# Patient Record
Sex: MALE | Race: WHITE | Employment: OTHER | URBAN - METROPOLITAN AREA
[De-identification: names, ages, dates, MRNs, and addresses within clinical notes are randomized per-mention and may not be internally consistent; named-entity substitution may affect disease eponyms.]

---

## 2017-01-03 ENCOUNTER — TELEPHONE (OUTPATIENT)
Dept: CARDIOLOGY CLINIC | Age: 62
End: 2017-01-03

## 2017-01-03 ENCOUNTER — OFFICE VISIT (OUTPATIENT)
Dept: CARDIOLOGY CLINIC | Age: 62
End: 2017-01-03

## 2017-01-03 ENCOUNTER — PROCEDURE VISIT (OUTPATIENT)
Dept: CARDIOLOGY CLINIC | Age: 62
End: 2017-01-03

## 2017-01-03 VITALS
BODY MASS INDEX: 28.3 KG/M2 | SYSTOLIC BLOOD PRESSURE: 134 MMHG | HEIGHT: 71 IN | OXYGEN SATURATION: 97 % | WEIGHT: 202.12 LBS | DIASTOLIC BLOOD PRESSURE: 84 MMHG | HEART RATE: 60 BPM

## 2017-01-03 DIAGNOSIS — Z95.810 AICD PRESENT, DOUBLE CHAMBER: ICD-10-CM

## 2017-01-03 DIAGNOSIS — I25.5 ISCHEMIC CARDIOMYOPATHY: ICD-10-CM

## 2017-01-03 DIAGNOSIS — I48.92 ATRIAL FLUTTER, UNSPECIFIED TYPE (HCC): ICD-10-CM

## 2017-01-03 DIAGNOSIS — I10 ESSENTIAL HYPERTENSION, BENIGN: ICD-10-CM

## 2017-01-03 DIAGNOSIS — I25.810 CORONARY ARTERY DISEASE INVOLVING CORONARY BYPASS GRAFT OF NATIVE HEART WITHOUT ANGINA PECTORIS: ICD-10-CM

## 2017-01-03 DIAGNOSIS — Z95.810 AICD PRESENT, DOUBLE CHAMBER: Primary | ICD-10-CM

## 2017-01-03 DIAGNOSIS — Z98.890 HX OF MITRAL VALVE REPAIR: ICD-10-CM

## 2017-01-03 DIAGNOSIS — I47.20 VENTRICULAR TACHYCARDIA: Primary | ICD-10-CM

## 2017-01-03 PROCEDURE — 99214 OFFICE O/P EST MOD 30 MIN: CPT | Performed by: INTERNAL MEDICINE

## 2017-01-03 PROCEDURE — 93283 PRGRMG EVAL IMPLANTABLE DFB: CPT | Performed by: INTERNAL MEDICINE

## 2017-02-08 ENCOUNTER — TELEPHONE (OUTPATIENT)
Dept: CARDIOLOGY CLINIC | Age: 62
End: 2017-02-08

## 2017-04-05 ENCOUNTER — TELEPHONE (OUTPATIENT)
Dept: CARDIOLOGY CLINIC | Age: 62
End: 2017-04-05

## 2017-04-05 ENCOUNTER — NURSE ONLY (OUTPATIENT)
Dept: CARDIOLOGY CLINIC | Age: 62
End: 2017-04-05

## 2017-04-05 DIAGNOSIS — I25.5 ISCHEMIC CARDIOMYOPATHY: ICD-10-CM

## 2017-04-05 DIAGNOSIS — Z95.810 AICD PRESENT, DOUBLE CHAMBER: ICD-10-CM

## 2017-04-05 DIAGNOSIS — Z95.810 CARDIAC DEFIBRILLATOR IN PLACE: ICD-10-CM

## 2017-04-05 PROCEDURE — 93295 DEV INTERROG REMOTE 1/2/MLT: CPT | Performed by: INTERNAL MEDICINE

## 2017-04-05 PROCEDURE — 93296 REM INTERROG EVL PM/IDS: CPT | Performed by: INTERNAL MEDICINE

## 2017-05-22 ENCOUNTER — OFFICE VISIT (OUTPATIENT)
Dept: CARDIOLOGY CLINIC | Age: 62
End: 2017-05-22

## 2017-05-22 VITALS
HEIGHT: 71 IN | WEIGHT: 207 LBS | SYSTOLIC BLOOD PRESSURE: 158 MMHG | BODY MASS INDEX: 28.98 KG/M2 | HEART RATE: 60 BPM | DIASTOLIC BLOOD PRESSURE: 90 MMHG

## 2017-05-22 DIAGNOSIS — I10 ESSENTIAL HYPERTENSION, BENIGN: ICD-10-CM

## 2017-05-22 DIAGNOSIS — I25.10 ATHEROSCLEROSIS OF NATIVE CORONARY ARTERY OF NATIVE HEART WITHOUT ANGINA PECTORIS: Primary | ICD-10-CM

## 2017-05-22 DIAGNOSIS — I05.9 MITRAL VALVE DISORDER: ICD-10-CM

## 2017-05-22 PROCEDURE — 99213 OFFICE O/P EST LOW 20 MIN: CPT | Performed by: INTERNAL MEDICINE

## 2017-05-22 RX ORDER — LISINOPRIL AND HYDROCHLOROTHIAZIDE 12.5; 1 MG/1; MG/1
1 TABLET ORAL DAILY
Qty: 90 TABLET | Refills: 3 | Status: SHIPPED | OUTPATIENT
Start: 2017-05-22 | End: 2018-04-18 | Stop reason: SDUPTHER

## 2017-06-20 ENCOUNTER — TELEPHONE (OUTPATIENT)
Dept: CARDIOLOGY CLINIC | Age: 62
End: 2017-06-20

## 2017-06-20 DIAGNOSIS — I10 ESSENTIAL HYPERTENSION, BENIGN: Chronic | ICD-10-CM

## 2017-06-20 DIAGNOSIS — I82.4Z9 DEEP VEIN THROMBOSIS (DVT) OF DISTAL VEIN OF LOWER EXTREMITY, UNSPECIFIED CHRONICITY, UNSPECIFIED LATERALITY (HCC): ICD-10-CM

## 2017-06-20 DIAGNOSIS — I25.5 ISCHEMIC CARDIOMYOPATHY: ICD-10-CM

## 2017-06-20 DIAGNOSIS — I63.9 IMPENDING CEREBROVASCULAR ACCIDENT (HCC): Primary | ICD-10-CM

## 2017-06-20 DIAGNOSIS — I25.810 CORONARY ARTERY DISEASE INVOLVING CORONARY BYPASS GRAFT OF NATIVE HEART WITHOUT ANGINA PECTORIS: ICD-10-CM

## 2017-06-23 ENCOUNTER — TELEPHONE (OUTPATIENT)
Dept: CARDIOLOGY CLINIC | Age: 62
End: 2017-06-23

## 2017-07-05 ENCOUNTER — TELEPHONE (OUTPATIENT)
Dept: CARDIOLOGY CLINIC | Age: 62
End: 2017-07-05

## 2017-07-05 DIAGNOSIS — I10 ESSENTIAL HYPERTENSION, BENIGN: Chronic | ICD-10-CM

## 2017-07-05 DIAGNOSIS — E78.5 HYPERLIPIDEMIA, UNSPECIFIED HYPERLIPIDEMIA TYPE: Primary | Chronic | ICD-10-CM

## 2017-07-05 RX ORDER — ATORVASTATIN CALCIUM 20 MG/1
20 TABLET, FILM COATED ORAL DAILY
Qty: 90 TABLET | Refills: 3 | Status: SHIPPED | OUTPATIENT
Start: 2017-07-05 | End: 2018-08-08 | Stop reason: SDUPTHER

## 2017-07-14 RX ORDER — METOPROLOL SUCCINATE 50 MG/1
TABLET, EXTENDED RELEASE ORAL
Qty: 30 TABLET | Refills: 10 | Status: SHIPPED | OUTPATIENT
Start: 2017-07-14 | End: 2018-07-05 | Stop reason: SDUPTHER

## 2017-07-19 ENCOUNTER — NURSE ONLY (OUTPATIENT)
Dept: CARDIOLOGY CLINIC | Age: 62
End: 2017-07-19

## 2017-07-19 DIAGNOSIS — I25.5 ISCHEMIC CARDIOMYOPATHY: ICD-10-CM

## 2017-07-19 DIAGNOSIS — Z95.810 AICD PRESENT, DOUBLE CHAMBER: ICD-10-CM

## 2017-07-19 DIAGNOSIS — Z95.810 CARDIAC DEFIBRILLATOR IN PLACE: ICD-10-CM

## 2017-07-19 PROCEDURE — 93296 REM INTERROG EVL PM/IDS: CPT | Performed by: INTERNAL MEDICINE

## 2017-07-19 PROCEDURE — 93295 DEV INTERROG REMOTE 1/2/MLT: CPT | Performed by: INTERNAL MEDICINE

## 2017-10-25 ENCOUNTER — NURSE ONLY (OUTPATIENT)
Dept: CARDIOLOGY CLINIC | Age: 62
End: 2017-10-25

## 2017-10-25 DIAGNOSIS — Z95.810 CARDIAC DEFIBRILLATOR IN PLACE: ICD-10-CM

## 2017-10-25 DIAGNOSIS — Z95.810 AICD PRESENT, DOUBLE CHAMBER: ICD-10-CM

## 2017-10-25 DIAGNOSIS — I25.5 ISCHEMIC CARDIOMYOPATHY: ICD-10-CM

## 2017-10-25 PROCEDURE — 93296 REM INTERROG EVL PM/IDS: CPT | Performed by: INTERNAL MEDICINE

## 2017-10-25 PROCEDURE — 93295 DEV INTERROG REMOTE 1/2/MLT: CPT | Performed by: INTERNAL MEDICINE

## 2017-10-31 ENCOUNTER — PROCEDURE VISIT (OUTPATIENT)
Dept: CARDIOLOGY CLINIC | Age: 62
End: 2017-10-31

## 2017-10-31 ENCOUNTER — HOSPITAL ENCOUNTER (OUTPATIENT)
Dept: OTHER | Age: 62
Discharge: HOME | End: 2017-10-31
Attending: INTERNAL MEDICINE | Admitting: INTERNAL MEDICINE

## 2017-10-31 ENCOUNTER — OFFICE VISIT (OUTPATIENT)
Dept: CARDIOLOGY CLINIC | Age: 62
End: 2017-10-31

## 2017-10-31 VITALS
OXYGEN SATURATION: 97 % | HEIGHT: 71 IN | HEART RATE: 72 BPM | DIASTOLIC BLOOD PRESSURE: 80 MMHG | WEIGHT: 210 LBS | SYSTOLIC BLOOD PRESSURE: 122 MMHG | BODY MASS INDEX: 29.4 KG/M2

## 2017-10-31 DIAGNOSIS — Z95.810 AICD PRESENT, DOUBLE CHAMBER: ICD-10-CM

## 2017-10-31 DIAGNOSIS — I48.92 ATRIAL FLUTTER, UNSPECIFIED TYPE (HCC): ICD-10-CM

## 2017-10-31 DIAGNOSIS — I48.92 ATRIAL FLUTTER, UNSPECIFIED TYPE (HCC): Primary | ICD-10-CM

## 2017-10-31 DIAGNOSIS — I47.20 VENTRICULAR TACHYCARDIA: ICD-10-CM

## 2017-10-31 DIAGNOSIS — Z79.899 LONG TERM CURRENT USE OF AMIODARONE: ICD-10-CM

## 2017-10-31 DIAGNOSIS — I25.5 ISCHEMIC CARDIOMYOPATHY: ICD-10-CM

## 2017-10-31 DIAGNOSIS — I10 ESSENTIAL HYPERTENSION: ICD-10-CM

## 2017-10-31 DIAGNOSIS — I25.10 ATHEROSCLEROSIS OF NATIVE CORONARY ARTERY OF NATIVE HEART WITHOUT ANGINA PECTORIS: ICD-10-CM

## 2017-10-31 LAB
ALT SERPL-CCNC: 17 U/L (ref 10–40)
ANION GAP SERPL CALCULATED.3IONS-SCNC: 16 MMOL/L (ref 3–16)
AST SERPL-CCNC: 21 U/L (ref 15–37)
BUN BLDV-MCNC: 20 MG/DL (ref 7–20)
CALCIUM SERPL-MCNC: 9.7 MG/DL (ref 8.3–10.6)
CHLORIDE BLD-SCNC: 99 MMOL/L (ref 99–110)
CO2: 26 MMOL/L (ref 21–32)
CREAT SERPL-MCNC: 1.2 MG/DL (ref 0.8–1.3)
GFR AFRICAN AMERICAN: >60
GFR NON-AFRICAN AMERICAN: >60
GLUCOSE BLD-MCNC: 95 MG/DL (ref 70–99)
POTASSIUM SERPL-SCNC: 4.5 MMOL/L (ref 3.5–5.1)
SODIUM BLD-SCNC: 141 MMOL/L (ref 136–145)
TSH REFLEX: 1.89 UIU/ML (ref 0.27–4.2)

## 2017-10-31 PROCEDURE — 99214 OFFICE O/P EST MOD 30 MIN: CPT | Performed by: INTERNAL MEDICINE

## 2017-10-31 PROCEDURE — 93283 PRGRMG EVAL IMPLANTABLE DFB: CPT | Performed by: INTERNAL MEDICINE

## 2017-10-31 RX ORDER — AMIODARONE HYDROCHLORIDE 200 MG/1
200 TABLET ORAL 2 TIMES DAILY
Qty: 180 TABLET | Refills: 3 | Status: SHIPPED | OUTPATIENT
Start: 2017-10-31 | End: 2018-11-05 | Stop reason: SDUPTHER

## 2017-10-31 NOTE — COMMUNICATION BODY
[address]   Electrophysiology   Date: 10/31/2017  I had the privilege of visiting Sarah Figueredo in the office. HPI: Sarah Figueredo is a 57 y.o. who is here for a follow up visit. 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 He has had atrial flutter on his interrogation of device. Xarelto prescribed but patient has not started it yet due to the cost and risk of bleeding. He is well aware of risk of stroke. On his device interrogation, it has been noted that he has had multiple episodes of ventricular tachycardia requiring ATP. Interval History: Mr Emigdio Caputo was brought in today after device showed 14 episodes of VT requiring ATP. He denies having any symptoms. He has no chest pain, change in exertional shortness of breath palpitations or dizziness. He has not had any falls. He used to be taking amiodarone for ventricular tachycardia. Patient has seen Dr. arndt recently and hydrochlorothiazide has been for his blood pressure. He also has discussed anticoagulation with Dr. arndt. Device interrogation today revealed 30 episodes of ventricular tachycardia. Many of these episodes terminated successfully with ATP. Past Medical History:   Diagnosis Date    CAD (coronary artery disease)     [facility] [facility]     Hyperlipidemia     Hypertension     MI (myocardial infarction)     VT (ventricular tachycardia) ([facility] [address]         Past Surgical History:   Procedure Laterality Date    CARDIAC DEFIBRILLATOR PLACEMENT  8/27/15    dual chamber ICD, Medtronic    CARDIAC SURGERY      cabg x 3, cabg ?  2 v second time    DIAGNOSTIC CARDIAC CATH LAB PROCEDURE         Allergies:  No Known Allergies    Medication:  Current Outpatient Prescriptions   Medication Sig Dispense Refill    metoprolol succinate (TOPROL XL) 50 MG extended release tablet TAKE ONE TABLET BY MOUTH DAILY 30 tablet 10    atorvastatin (LIPITOR) 20 MG tablet Take 1 tablet by mouth daily 90 tablet 3    Scooter MISC by Does not apply route 1 each 0    lisinopril-hydrochlorothiazide (PRINZIDE;ZESTORETIC) 10-12.5 MG per tablet Take 1 tablet by mouth daily 90 tablet 3    aspirin 81 MG EC tablet Take 162 mg by mouth daily        No current facility-administered medications for this visit. Social History:  Reviewed. reports that he quit smoking about 2 years ago. He quit after 40.00 years of use. He has never used smokeless tobacco. He reports that he drinks alcohol. He reports that he uses drugs, including Marijuana. Family History:  Reviewed. family history includes Heart Disease in his father and mother. Review of System:    · General ROS: negative for - chills, fever   · Psychological ROS: negative for - anxiety or depression  · Ophthalmic ROS: negative for - eye pain or loss of vision  · ENT ROS: negative for - headaches, sore throat   · Allergy and Immunology ROS: negative for - hives  · Hematological and Lymphatic ROS: negative for - bleeding problems, blood clots, bruising or jaundice  · Endocrine ROS: negative for - skin changes, temperature intolerance or unexpected weight changes  · Respiratory ROS: negative for - cough, sputum, wheezing  · Cardiovascular ROS: Per HPI. · Gastrointestinal ROS: negative for - abdominal pain, diarrhea, nausea/vomiting, bleeding   · Genito-Urinary ROS: negative for - dysuria or incontinence  · Musculoskeletal ROS: negative for - joint swelling   · Neurological ROS: negative for - confusion, numbness/tingling, seizures, weakness  · Dermatological ROS: negative for - rash    Physical Examination:  Vitals:    10/31/17 1214   BP: 122/80   Pulse: 72   SpO2: 97%       · Constitutional: Oriented. No distress. · Head: Normocephalic and atraumatic.    · Mouth/Throat: Oropharynx is clear and moist. · Eyes: Conjunctivae normal. EOM are normal.   · Neck: Normal range of motion. Neck supple. No rigidity. No JVD present. · Cardiovascular: Normal rate, regular rhythm, E7&Y0 II/VI systolic murmur   · Pulmonary/Chest: Bilateral respiratory sounds. No wheezes. No rhonchi. · Abdominal: Soft. Bowel sounds present. No distension, No tenderness. · Musculoskeletal: No tenderness. No edema    · Lymphadenopathy: Has no cervical adenopathy. · Neurological: Alert and oriented. LUE weakness  · Skin: Skin is warm and dry. No rash noted. · Psychiatric: Has a normal behavior       Labs:  Reviewed. ECG:   Echo: 8/26/15  Summary  -Normal left ventricular wall thickness.  -Left ventricular size is mildly increased .  -Left ventricular function is mildly decreased with ejection fraction  estimated at 45%. -There is hypokinesis of the inferior wall noted. -An annuloplasty ring is seen in the mitral position.  -Mild to moderate mitral regurgitation is present.  -The left atrium is dilated. -There is trivial tricuspid regurgitation with RVSP estimated at 24 mmHg. History:   1993 CABG  3/2008: Re-do CABG SVG to PDA (pre op cath showed patent LIMA to LAD, SVG to RCA/OM occluded. Collaterals from LCA to RCA and Septals to CX.   3/2008: MV repair using 26mm Physio annuloplasty ring.      Assessment:   Patient Active Problem List    Diagnosis Date Noted    Atrial flutter ([facility] [address] 01/03/2017    Hx of mitral valve repair 01/03/2017    Coronary artery disease involving coronary bypass graft of native heart without angina pectoris     AICD present, double chamber 08/27/2015    Ischemic cardiomyopathy     Ventricular tachycardia ([address] 08/25/2015    HLD (hyperlipidemia) 09/21/2011    Coronary Artery Disease 09/21/2011    Essential hypertension, benign 09/21/2011    Mitral valve disorder 09/21/2011    Old MI (myocardial infarction) 09/21/2011    Endocarditis 09/21/2011    CVA (cerebral vascular accident) ([address]

## 2017-10-31 NOTE — PROGRESS NOTES
[address]   Electrophysiology   Date: 10/31/2017  I had the privilege of visiting Leo Edwards in the office. HPI: Leo Edwards is a 57 y.o. who is here for a follow up visit. 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 He has had atrial flutter on his interrogation of device. Xarelto prescribed but patient has not started it yet due to the cost and risk of bleeding. He is well aware of risk of stroke. On his device interrogation, it has been noted that he has had multiple episodes of ventricular tachycardia requiring ATP. Interval History: Mr Neptali Gallardo was brought in today after device showed 14 episodes of VT requiring ATP. He denies having any symptoms. He has no chest pain, change in exertional shortness of breath palpitations or dizziness. He has not had any falls. He used to be taking amiodarone for ventricular tachycardia. Patient has seen Dr. arndt recently and hydrochlorothiazide has been for his blood pressure. He also has discussed anticoagulation with Dr. arndt. Device interrogation today revealed 30 episodes of ventricular tachycardia. Many of these episodes terminated successfully with ATP. Past Medical History:   Diagnosis Date    CAD (coronary artery disease)     [facility] [address]     Hyperlipidemia     Hypertension     MI (myocardial infarction)     VT (ventricular tachycardia) ([facility] [address]         Past Surgical History:   Procedure Laterality Date    CARDIAC DEFIBRILLATOR PLACEMENT  8/27/15    dual chamber ICD, Medtronic    CARDIAC SURGERY      cabg x 3, cabg ?  2 v second time    DIAGNOSTIC CARDIAC CATH LAB PROCEDURE         Allergies:  No Known Allergies    Medication:  Current Outpatient Prescriptions   Medication Sig Dispense Refill    metoprolol succinate (TOPROL XL) 50 MG extended release tablet TAKE ONE TABLET BY MOUTH DAILY 30 tablet 10    atorvastatin (LIPITOR) 20 MG tablet Take 1 tablet by mouth daily 90 tablet 3    Scooter MISC by Does not apply route 1 each 0    lisinopril-hydrochlorothiazide (PRINZIDE;ZESTORETIC) 10-12.5 MG per tablet Take 1 tablet by mouth daily 90 tablet 3    aspirin 81 MG EC tablet Take 162 mg by mouth daily        No current facility-administered medications for this visit. Social History:  Reviewed. reports that he quit smoking about 2 years ago. He quit after 40.00 years of use. He has never used smokeless tobacco. He reports that he drinks alcohol. He reports that he uses drugs, including Marijuana. Family History:  Reviewed. family history includes Heart Disease in his father and mother. Review of System:    · General ROS: negative for - chills, fever   · Psychological ROS: negative for - anxiety or depression  · Ophthalmic ROS: negative for - eye pain or loss of vision  · ENT ROS: negative for - headaches, sore throat   · Allergy and Immunology ROS: negative for - hives  · Hematological and Lymphatic ROS: negative for - bleeding problems, blood clots, bruising or jaundice  · Endocrine ROS: negative for - skin changes, temperature intolerance or unexpected weight changes  · Respiratory ROS: negative for - cough, sputum, wheezing  · Cardiovascular ROS: Per HPI. · Gastrointestinal ROS: negative for - abdominal pain, diarrhea, nausea/vomiting, bleeding   · Genito-Urinary ROS: negative for - dysuria or incontinence  · Musculoskeletal ROS: negative for - joint swelling   · Neurological ROS: negative for - confusion, numbness/tingling, seizures, weakness  · Dermatological ROS: negative for - rash    Physical Examination:  Vitals:    10/31/17 1214   BP: 122/80   Pulse: 72   SpO2: 97%       · Constitutional: Oriented. No distress. · Head: Normocephalic and atraumatic.    · Mouth/Throat: Oropharynx is clear and moist. · Eyes: Conjunctivae normal. EOM are normal.   · Neck: Normal range of motion. Neck supple. No rigidity. No JVD present. · Cardiovascular: Normal rate, regular rhythm, A0&Z6 II/VI systolic murmur   · Pulmonary/Chest: Bilateral respiratory sounds. No wheezes. No rhonchi. · Abdominal: Soft. Bowel sounds present. No distension, No tenderness. · Musculoskeletal: No tenderness. No edema    · Lymphadenopathy: Has no cervical adenopathy. · Neurological: Alert and oriented. LUE weakness  · Skin: Skin is warm and dry. No rash noted. · Psychiatric: Has a normal behavior       Labs:  Reviewed. ECG:   Echo: 8/26/15  Summary  -Normal left ventricular wall thickness.  -Left ventricular size is mildly increased .  -Left ventricular function is mildly decreased with ejection fraction  estimated at 45%. -There is hypokinesis of the inferior wall noted. -An annuloplasty ring is seen in the mitral position.  -Mild to moderate mitral regurgitation is present.  -The left atrium is dilated. -There is trivial tricuspid regurgitation with RVSP estimated at 24 mmHg. History:   1993 CABG  3/2008: Re-do CABG SVG to PDA (pre op cath showed patent LIMA to LAD, SVG to RCA/OM occluded. Collaterals from LCA to RCA and Septals to CX.   3/2008: MV repair using 26mm Physio annuloplasty ring.      Assessment:   Patient Active Problem List    Diagnosis Date Noted    Atrial flutter ([facility] [address] 01/03/2017    Hx of mitral valve repair 01/03/2017    Coronary artery disease involving coronary bypass graft of native heart without angina pectoris     AICD present, double chamber 08/27/2015    Ischemic cardiomyopathy     Ventricular tachycardia ([address] 08/25/2015    HLD (hyperlipidemia) 09/21/2011    Coronary Artery Disease 09/21/2011    Essential hypertension, benign 09/21/2011    Mitral valve disorder 09/21/2011    Old MI (myocardial infarction) 09/21/2011    Endocarditis 09/21/2011    CVA (cerebral vascular accident) ([address] diet to assure blood pressure remains controlled for cardiovascular risk factor modification.   - The patient is counseled to avoid excess caffeine, and energy drinks as this may exacerbated ectopy and arrhythmia. - The patient is counseled to get regular exercise 3-5 times per week to control cardiovascular risk factors. Thank you for allowing me to participate in the care of Daily Velez. Further evaluation will be based upon the patient's clinical course and testing results. All questions and concerns were addressed to the patient/family. Alternatives to my treatment were discussed.         Yoli Pierre MD, MPH  [place] [address]   Office: [phone]

## 2017-10-31 NOTE — PROGRESS NOTES
Patient comes in for programming evaluation for his defibrillator. All sensing and pacing parameters are within normal range. He has had increased VT recordings requiring ATP. No changes need to be made at this time. Please see interrogation for more detail. Patient will see Dr. Ruthy Momin today.

## 2017-10-31 NOTE — LETTER
Diagnosis Date Noted    Atrial flutter ([facility] 01/03/2017    Hx of mitral valve repair 01/03/2017    Coronary artery disease involving coronary bypass graft of native heart without angina pectoris     AICD present, double chamber 08/27/2015    Ischemic cardiomyopathy     Ventricular tachycardia ([facility] 08/25/2015    HLD (hyperlipidemia) 09/21/2011    Coronary Artery Disease 09/21/2011    Essential hypertension, benign 09/21/2011    Mitral valve disorder 09/21/2011    Old MI (myocardial infarction) 09/21/2011    Endocarditis 09/21/2011    [facility] 09/21/2011    DVT (deep vein thrombosis), lower extremity, distal (calf) (peroneal)(tibial) (lower leg NOS) 09/21/2011        Plan:    - Ventricular tachycardia, CAD:    - Multiple episodes of ventricular tachycardia with cycle length of 300 msec, treated successfully with ATP. - Treatment options including antiarrhythmic therapy and/or ablation were discussed with patient. Risks, benefits and alternative of each treatment options were explained. All questions answered. - Discussed treatment with Amiodarone. Risks, benefits and alternative were explained. Dicussed side effects of amiodarone therapy. Patient would like to proceed with therapy. - Start Amiodarone 200 mg bid. - If continues having VT, will consider ablation.      - Continue BB and ACE-i.      - Echo    - Myoview for evaluation of ischemia. - Paroxysmal Atrial Flutter   - Diagnosed with device interrogation on 10/31/16 . He was asymptomatic.    - Patient has high [id number] score and requires anticoagulation to prevent thromboembolic events. History of stroke. - I have discussed anticoagulation to decrease the risk of thromboembolic. Has not started Xarelto. - Prescription has been given. - He also has discussed this with Dr. Oswaldo Seymour. - He is well aware of risk of stroke. - CAD s/p CABG in the 1997 and 2008. - No angina.

## 2017-11-09 ENCOUNTER — HOSPITAL ENCOUNTER (OUTPATIENT)
Dept: NON INVASIVE DIAGNOSTICS | Age: 62
Discharge: HOME | End: 2017-11-09
Attending: INTERNAL MEDICINE | Admitting: INTERNAL MEDICINE

## 2017-11-09 DIAGNOSIS — I48.92 ATRIAL FLUTTER (HCC): ICD-10-CM

## 2017-11-09 LAB
LV EF: 41 %
LV EF: 45 %
LVEF MODALITY: NORMAL
LVEF MODALITY: NORMAL

## 2018-01-30 ENCOUNTER — OFFICE VISIT (OUTPATIENT)
Dept: CARDIOLOGY CLINIC | Age: 63
End: 2018-01-30

## 2018-01-30 ENCOUNTER — PROCEDURE VISIT (OUTPATIENT)
Dept: CARDIOLOGY CLINIC | Age: 63
End: 2018-01-30

## 2018-01-30 ENCOUNTER — HOSPITAL ENCOUNTER (OUTPATIENT)
Dept: OTHER | Age: 63
Discharge: HOME | End: 2018-01-30
Attending: INTERNAL MEDICINE | Admitting: INTERNAL MEDICINE

## 2018-01-30 VITALS
SYSTOLIC BLOOD PRESSURE: 112 MMHG | DIASTOLIC BLOOD PRESSURE: 68 MMHG | OXYGEN SATURATION: 98 % | HEART RATE: 60 BPM | HEIGHT: 71 IN | WEIGHT: 213 LBS | BODY MASS INDEX: 29.82 KG/M2

## 2018-01-30 DIAGNOSIS — I25.5 ISCHEMIC CARDIOMYOPATHY: ICD-10-CM

## 2018-01-30 DIAGNOSIS — Z95.810 AICD PRESENT, DOUBLE CHAMBER: ICD-10-CM

## 2018-01-30 DIAGNOSIS — I47.20 VENTRICULAR TACHYCARDIA: Primary | ICD-10-CM

## 2018-01-30 DIAGNOSIS — I25.10 ATHEROSCLEROSIS OF NATIVE CORONARY ARTERY OF NATIVE HEART WITHOUT ANGINA PECTORIS: ICD-10-CM

## 2018-01-30 DIAGNOSIS — I48.0 PAROXYSMAL ATRIAL FIBRILLATION (HCC): ICD-10-CM

## 2018-01-30 DIAGNOSIS — I10 ESSENTIAL HYPERTENSION, BENIGN: Chronic | ICD-10-CM

## 2018-01-30 DIAGNOSIS — I63.9 CEREBROVASCULAR ACCIDENT (CVA), UNSPECIFIED MECHANISM (HCC): ICD-10-CM

## 2018-01-30 LAB
ALBUMIN SERPL-MCNC: 4.4 G/DL (ref 3.4–5)
ALP BLD-CCNC: 117 U/L (ref 40–129)
ALT SERPL-CCNC: 13 U/L (ref 10–40)
AST SERPL-CCNC: 19 U/L (ref 15–37)
BILIRUB SERPL-MCNC: 0.4 MG/DL (ref 0–1)
BILIRUBIN DIRECT: <0.2 MG/DL (ref 0–0.3)
BILIRUBIN, INDIRECT: NORMAL MG/DL (ref 0–1)
TOTAL PROTEIN: 7.6 G/DL (ref 6.4–8.2)
TSH REFLEX: 4 UIU/ML (ref 0.27–4.2)

## 2018-01-30 PROCEDURE — 93283 PRGRMG EVAL IMPLANTABLE DFB: CPT | Performed by: INTERNAL MEDICINE

## 2018-01-30 PROCEDURE — 99215 OFFICE O/P EST HI 40 MIN: CPT | Performed by: INTERNAL MEDICINE

## 2018-01-30 NOTE — PROGRESS NOTES
Patient comes in for programming evaluation for his defibrillator. All sensing and pacing parameters are within normal range. He has had some VT recordings that were long that were below his detection rate. Changed VT detection to 150. Please see interrogation for more detail. Patient will follow up in 3 months in office or remotely.

## 2018-01-30 NOTE — PROGRESS NOTES
TAKE ONE TABLET BY MOUTH DAILY 30 tablet 10    atorvastatin (LIPITOR) 20 MG tablet Take 1 tablet by mouth daily 90 tablet 3    Scooter MISC by Does not apply route 1 each 0    lisinopril-hydrochlorothiazide (PRINZIDE;ZESTORETIC) 10-12.5 MG per tablet Take 1 tablet by mouth daily 90 tablet 3    aspirin 81 MG EC tablet Take 162 mg by mouth daily        No current facility-administered medications for this visit. Social History:  Reviewed. reports that he quit smoking about 2 years ago. He quit after 40.00 years of use. He has never used smokeless tobacco. He reports that he drinks alcohol. He reports that he uses drugs, including Marijuana. Family History:  Reviewed. family history includes Heart Disease in his father and mother. Review of System:    · General ROS: negative for - chills, fever   · Psychological ROS: negative for - anxiety or depression  · Ophthalmic ROS: negative for - eye pain or loss of vision  · ENT ROS: negative for - headaches, sore throat   · Allergy and Immunology ROS: negative for - hives  · Hematological and Lymphatic ROS: negative for - bleeding problems, blood clots, bruising or jaundice  · Endocrine ROS: negative for - skin changes, temperature intolerance or unexpected weight changes  · Respiratory ROS: negative for - cough, sputum, wheezing  · Cardiovascular ROS: Per HPI. · Gastrointestinal ROS: negative for - abdominal pain, diarrhea, nausea/vomiting, bleeding   · Genito-Urinary ROS: negative for - dysuria or incontinence  · Musculoskeletal ROS: negative for - joint swelling   · Neurological ROS: negative for - confusion, numbness/tingling, seizures, ++ Left sided weakness  · Dermatological ROS: negative for - rash    Physical Examination:  Vitals:    01/30/18 1053   BP: 112/68   Pulse: 60   SpO2: 98%     · Constitutional: Oriented. No distress. · Head: Normocephalic and atraumatic.    · Mouth/Throat: Oropharynx is clear and moist.   · Eyes: Conjunctivae normal. moderately reduced with inferior lateral akinesis and    ejection fraction of 41%. Recommendation    Discussed with pt and wife. They will f/u soon with Dr. Denae Harris for questions surrounding antiarrhythmic    therapy     Imaging Results          Stress ejection      Ejection fraction:41 %        History:   1993 CABG  3/2008: Re-do CABG SVG to PDA (pre op cath showed patent LIMA to LAD, SVG to RCA/OM occluded. Collaterals from LCA to RCA and Septals to CX.   3/2008: MV repair using 26mm Physio annuloplasty ring. Assessment:   Patient Active Problem List    Diagnosis Date Noted    Atrial flutter ([address] 01/03/2017    Hx of mitral valve repair 01/03/2017    Coronary artery disease involving coronary bypass graft of native heart without angina pectoris     AICD present, double chamber 08/27/2015    Ischemic cardiomyopathy     Ventricular tachycardia ([address] 08/25/2015    HLD (hyperlipidemia) 09/21/2011    Coronary Artery Disease 09/21/2011    Essential hypertension, benign 09/21/2011    Mitral valve disorder 09/21/2011    Old MI (myocardial infarction) 09/21/2011    Endocarditis 09/21/2011    CVA (cerebral vascular accident) ([address] 09/21/2011    DVT (deep vein thrombosis), lower extremity, distal (calf) (peroneal)(tibial) (lower leg NOS) 09/21/2011        Plan:    - Ventricular tachycardia, CAD:    - Multiple episodes of ventricular tachycardia treated successfully with ATP. - Treated with Amiodarone. - Continues having VT but now slower. - VT cycle length ~ 400 msec. - Long discussion with patient and his wife regarding treatment options.      - Recommend ablation of VT.      - The risks, benefits and alternatives of the ablation procedure were discussed with the patient.  The risks including, but not limited to, the risks of bleeding, infection, radiation exposure, injury to vascular, cardiac and surrounding structures (including pneumothorax), stroke, cardiac perforation, tamponade, need

## 2018-02-14 ENCOUNTER — OFFICE VISIT (OUTPATIENT)
Dept: CARDIOLOGY CLINIC | Age: 63
End: 2018-02-14

## 2018-02-14 VITALS
DIASTOLIC BLOOD PRESSURE: 70 MMHG | HEIGHT: 71 IN | SYSTOLIC BLOOD PRESSURE: 130 MMHG | HEART RATE: 64 BPM | BODY MASS INDEX: 29.68 KG/M2 | WEIGHT: 212 LBS

## 2018-02-14 DIAGNOSIS — I05.9 MITRAL VALVE DISEASE: ICD-10-CM

## 2018-02-14 DIAGNOSIS — I10 ESSENTIAL HYPERTENSION, BENIGN: Chronic | ICD-10-CM

## 2018-02-14 DIAGNOSIS — I25.10 ATHEROSCLEROSIS OF NATIVE CORONARY ARTERY OF NATIVE HEART WITHOUT ANGINA PECTORIS: Primary | ICD-10-CM

## 2018-02-14 DIAGNOSIS — E78.5 HYPERLIPIDEMIA, UNSPECIFIED HYPERLIPIDEMIA TYPE: Chronic | ICD-10-CM

## 2018-02-14 DIAGNOSIS — I47.20 VENTRICULAR TACHYCARDIA: ICD-10-CM

## 2018-02-14 DIAGNOSIS — I63.9 CEREBROVASCULAR ACCIDENT (CVA), UNSPECIFIED MECHANISM (HCC): ICD-10-CM

## 2018-02-14 DIAGNOSIS — I25.5 ISCHEMIC CARDIOMYOPATHY: ICD-10-CM

## 2018-02-14 PROCEDURE — 99214 OFFICE O/P EST MOD 30 MIN: CPT | Performed by: INTERNAL MEDICINE

## 2018-02-14 RX ORDER — ATORVASTATIN CALCIUM 40 MG/1
40 TABLET, FILM COATED ORAL DAILY
Qty: 90 TABLET | Refills: 0 | Status: SHIPPED | OUTPATIENT
Start: 2018-02-14 | End: 2018-07-20 | Stop reason: SDUPTHER

## 2018-02-14 NOTE — PROGRESS NOTES
lisinopril-hydrochlorothiazide (PRINZIDE;ZESTORETIC) 10-12.5 MG per tablet Take 1 tablet by mouth daily 90 tablet 3    aspirin 81 MG EC tablet Take 81 mg by mouth daily        No current facility-administered medications for this visit. Social History:  Reviewed. reports that he quit smoking about 2 years ago. He quit after 40.00 years of use. He has never used smokeless tobacco. He reports that he drinks alcohol. He reports that he uses drugs, including Marijuana. Family History:  Reviewed. family history includes Heart Disease in his father and mother. Review of System:    · General ROS: negative for - chills, fever   · Psychological ROS: negative for - anxiety or depression  · Ophthalmic ROS: negative for - eye pain or loss of vision  · ENT ROS: negative for - headaches, sore throat   · Allergy and Immunology ROS: negative for - hives  · Hematological and Lymphatic ROS: negative for - bleeding problems, blood clots, bruising or jaundice  · Endocrine ROS: negative for - skin changes, temperature intolerance or unexpected weight changes  · Respiratory ROS: negative for - cough, sputum, wheezing  · Cardiovascular ROS: Per HPI. · Gastrointestinal ROS: negative for - abdominal pain, diarrhea, nausea/vomiting, bleeding   · Genito-Urinary ROS: negative for - dysuria or incontinence  · Musculoskeletal ROS: negative for - joint swelling   · Neurological ROS: negative for - confusion, numbness/tingling, seizures, ++ Left sided weakness  · Dermatological ROS: negative for - rash    Physical Examination:  Vitals:    02/14/18 0937   BP: 130/70   Pulse: 64     · Constitutional: Oriented. No distress. · Head: Normocephalic and atraumatic. · Mouth/Throat: Oropharynx is clear and moist.   · Eyes: Conjunctivae normal. EOM are normal.   · Neck: Normal range of motion. Neck supple. No rigidity. No JVD present.    · Cardiovascular: Normal rate, regular rhythm, X9&U5 II/VI systolic murmur   · Pulmonary/Chest: CABG  3/2008: Re-do CABG SVG to PDA (pre op cath showed patent LIMA to LAD, SVG to RCA/OM occluded. Collaterals from LCA to RCA and Septals to CX.   3/2008: MV repair using 26mm Physio annuloplasty ring. Assessment:   Patient Active Problem List    Diagnosis Date Noted    Atrial flutter ([address] 01/03/2017    Hx of mitral valve repair 01/03/2017    Coronary artery disease involving coronary bypass graft of native heart without angina pectoris     AICD present, double chamber 08/27/2015    Ischemic cardiomyopathy     Ventricular tachycardia ([address] 08/25/2015    HLD (hyperlipidemia) 09/21/2011    Coronary Artery Disease 09/21/2011    Essential hypertension, benign 09/21/2011    Mitral valve disorder 09/21/2011    Old MI (myocardial infarction) 09/21/2011    Endocarditis 09/21/2011    CVA (cerebral vascular accident) ([address] 09/21/2011    DVT (deep vein thrombosis), lower extremity, distal (calf) (peroneal)(tibial) (lower leg NOS) 09/21/2011        Plan:    - Ventricular tachycardia, CAD:    - Multiple episodes of ventricular tachycardia treated successfully with ATP. - Treated with Amiodarone. - Continues having VT but now slower. - VT cycle length ~ 400 msec. - Long discussion with patient and his wife regarding treatment options.      - Recommend ablation of VT.      - The risks, benefits and alternatives of the ablation procedure were discussed with the patient. The risks including, but not limited to, the risks of bleeding, infection, radiation exposure, injury to vascular, cardiac and surrounding structures (including pneumothorax), stroke, cardiac perforation, tamponade, need for emergent open heart surgery, need for pacemaker implantation, Injury to the phrenic nerve, injury to the esophagus, myocardial infarction and death were discussed in detail. Success rate discussed ~ 70% and may need repeat ablation in future. - He is not sure and will think about the procedure.      -

## 2018-02-14 NOTE — LETTER
· Dermatological ROS: negative for - rash    Physical Examination:  Vitals:    18 0937   BP: 130/70   Pulse: 64     · Constitutional: Oriented. No distress. · Head: Normocephalic and atraumatic. · Mouth/Throat: Oropharynx is clear and moist.   · Eyes: Conjunctivae normal. EOM are normal.   · Neck: Normal range of motion. Neck supple. No rigidity. No JVD present. · Cardiovascular: Normal rate, regular rhythm, C5&K1 II/VI systolic murmur   · Pulmonary/Chest: Bilateral respiratory sounds. No wheezes. No rhonchi. · Abdominal: Soft. Bowel sounds present. No distension, No tenderness. · Musculoskeletal: No tenderness. No edema    · Lymphadenopathy: Has no cervical adenopathy. · Neurological: Alert and oriented. Left sided weakness from prior stroke   · Skin: Skin is warm and dry. No rash noted. · Psychiatric: Has a normal behavior     Labs:   Cr 1.2 (10/31/17)  ALT 17 (10/31/17)  AST 21 (10/31/17)  TSH 1.89  (10/31/17)  LDL 82 (16)  Reviewed. EC2018   A Paced     Echo 17  Summary   -Normal left ventricle size, wall thickness and systolic function with an   estimated ejection fraction of 45%. -There is severe inferior hypokinesis. -There is dense mitral annular calcification that may represent an   annuloplasty ring. The maximum mitral valve pressure gradient is 13 mmHg and   the mean pressure gradient is 5 mmHg. -Mild mitral regurgitation is present.   -Pacer / ICD wire is visualized in the right ventricle.   -Trivial tricuspid regurgitation. 8/26/15  Summary  -Normal left ventricular wall thickness.  -Left ventricular size is mildly increased .  -Left ventricular function is mildly decreased with ejection fraction  estimated at 45%. -There is hypokinesis of the inferior wall noted. -An annuloplasty ring is seen in the mitral position.  -Mild to moderate mitral regurgitation is present.  -The left atrium is dilated. discussed with the patient. The risks including, but not limited to, the risks of bleeding, infection, radiation exposure, injury to vascular, cardiac and surrounding structures (including pneumothorax), stroke, cardiac perforation, tamponade, need for emergent open heart surgery, need for pacemaker implantation, Injury to the phrenic nerve, injury to the esophagus, myocardial infarction and death were discussed in detail. Success rate discussed ~ 70% and may need repeat ablation in future. - He is not sure and will think about the procedure. - Discussed again treatment with Amiodarone. Side effects discussed in detail.    - Amio started 10/31/17,    - ALT 17 (10/31/17) AST 21 (10/31/17) TSH 1.89  (10/31/17)    - Continue BB and ACE-i. Based on history of VT, discussed that amio should not be discontinued  - Echo-Mild to moderate mitral regurgitation is present, LVEF 45%     - Paroxysmal Atrial Flutter   - Diagnosed with device interrogation on 10/31/16 . He was asymptomatic.    - Patient has high [id number] score and requires anticoagulation to prevent thromboembolic events. History of stroke. - I have discussed anticoagulation to decrease the risk of thromboembolic.    - Prescription has been given. - He also has discussed this with Dr. Michelle Mills in the past   - He is well aware of risk of stroke. - Not taking anticoagulation and is on ASA only. - CAD s/p CABG in the 1997 and 2008. - No angina.    - On ASA, BB and statin. - Cath on 8/21/15 and no intervention. Reviewed by interventional cardiology and no plan for cardiac cath. - Continue with aggressive medical therapy. - Now with frequent VTs.      - Myoview and follow up with interventional cardiology.  ? Cardiac catheterization?     - Ischemic cardiomyopathy, EF: 45% by recent echo   -  On Toprol XL and ACE-i.    - Myoview showed medium sized inferolateral fixed defect of severe intensity consisted with the infarction in the territory of the proximal to distal LCx and/or RCA.     - HTN:  controlled. 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 (2007)    - History of smoking Marijuana   Smoking counselling. Will return in 3 months with an echo to evaluate LV function    Thank you for allowing me to participate in the care of Ricki Colon. Teagan Segundo MD, [facility]     If you have questions, please do not hesitate to call me. I look forward to following Mahesh Grover along with you.     Sincerely,        Wendi Bourne MD

## 2018-04-18 RX ORDER — LISINOPRIL AND HYDROCHLOROTHIAZIDE 12.5; 1 MG/1; MG/1
1 TABLET ORAL DAILY
Qty: 90 TABLET | Refills: 3 | Status: SHIPPED | OUTPATIENT
Start: 2018-04-18 | End: 2018-04-30 | Stop reason: SDUPTHER

## 2018-04-30 RX ORDER — LISINOPRIL AND HYDROCHLOROTHIAZIDE 12.5; 1 MG/1; MG/1
1 TABLET ORAL DAILY
Qty: 90 TABLET | Refills: 3 | Status: SHIPPED | OUTPATIENT
Start: 2018-04-30 | End: 2019-06-12 | Stop reason: SDUPTHER

## 2018-05-01 ENCOUNTER — PROCEDURE VISIT (OUTPATIENT)
Dept: CARDIOLOGY CLINIC | Age: 63
End: 2018-05-01

## 2018-05-01 ENCOUNTER — OFFICE VISIT (OUTPATIENT)
Dept: CARDIOLOGY CLINIC | Age: 63
End: 2018-05-01

## 2018-05-01 VITALS
OXYGEN SATURATION: 97 % | HEART RATE: 58 BPM | DIASTOLIC BLOOD PRESSURE: 82 MMHG | BODY MASS INDEX: 28.7 KG/M2 | SYSTOLIC BLOOD PRESSURE: 138 MMHG | WEIGHT: 205 LBS | HEIGHT: 71 IN

## 2018-05-01 DIAGNOSIS — F12.90 MARIJUANA SMOKER: ICD-10-CM

## 2018-05-01 DIAGNOSIS — Z95.810 AICD PRESENT, DOUBLE CHAMBER: ICD-10-CM

## 2018-05-01 DIAGNOSIS — I10 ESSENTIAL HYPERTENSION, BENIGN: Chronic | ICD-10-CM

## 2018-05-01 DIAGNOSIS — I47.20 V TACH: Primary | ICD-10-CM

## 2018-05-01 DIAGNOSIS — I63.9 CEREBROVASCULAR ACCIDENT (CVA), UNSPECIFIED MECHANISM (HCC): ICD-10-CM

## 2018-05-01 DIAGNOSIS — I25.5 ISCHEMIC CARDIOMYOPATHY: ICD-10-CM

## 2018-05-01 DIAGNOSIS — I25.119 CORONARY ARTERY DISEASE INVOLVING NATIVE CORONARY ARTERY OF NATIVE HEART WITH ANGINA PECTORIS (HCC): ICD-10-CM

## 2018-05-01 DIAGNOSIS — I48.0 PAF (PAROXYSMAL ATRIAL FIBRILLATION) (HCC): ICD-10-CM

## 2018-05-01 PROCEDURE — 93283 PRGRMG EVAL IMPLANTABLE DFB: CPT | Performed by: INTERNAL MEDICINE

## 2018-05-01 PROCEDURE — 99214 OFFICE O/P EST MOD 30 MIN: CPT | Performed by: INTERNAL MEDICINE

## 2018-07-05 ENCOUNTER — TELEPHONE (OUTPATIENT)
Dept: CARDIOLOGY CLINIC | Age: 63
End: 2018-07-05

## 2018-07-05 RX ORDER — METOPROLOL SUCCINATE 50 MG/1
TABLET, EXTENDED RELEASE ORAL
Qty: 90 TABLET | Refills: 3 | Status: SHIPPED | OUTPATIENT
Start: 2018-07-05 | End: 2019-07-02 | Stop reason: SDUPTHER

## 2018-07-05 NOTE — TELEPHONE ENCOUNTER
Medication Refill    When was your last appointment with cardiology?  (if 1year or longer, please schedule an appointment) 5/01/2018    Medication needing refilled:metoprolol succinate (TOPROL XL) 50 MG extended release tablet   TAKE ONE TABLET BY MOUTH DAILY     Patient want a 30 or 90 day supply called in: 30 day     Which Pharmacy are we sending the medication to:[facility] [address], [address] [facility] [address] [facility] [phone]

## 2018-07-20 RX ORDER — ATORVASTATIN CALCIUM 40 MG/1
40 TABLET, FILM COATED ORAL DAILY
Qty: 90 TABLET | Refills: 1 | Status: SHIPPED | OUTPATIENT
Start: 2018-07-20 | End: 2018-08-08

## 2018-08-01 DIAGNOSIS — I25.10 ATHEROSCLEROSIS OF NATIVE CORONARY ARTERY OF NATIVE HEART WITHOUT ANGINA PECTORIS: Primary | ICD-10-CM

## 2018-08-01 DIAGNOSIS — Z79.899 ENCOUNTER FOR LONG-TERM (CURRENT) USE OF MEDICATIONS: ICD-10-CM

## 2018-08-01 DIAGNOSIS — E78.5 HYPERLIPIDEMIA, UNSPECIFIED HYPERLIPIDEMIA TYPE: Chronic | ICD-10-CM

## 2018-08-07 ENCOUNTER — NURSE ONLY (OUTPATIENT)
Dept: CARDIOLOGY CLINIC | Age: 63
End: 2018-08-07

## 2018-08-07 DIAGNOSIS — I47.20 VENTRICULAR TACHYCARDIA: ICD-10-CM

## 2018-08-07 DIAGNOSIS — Z95.810 CARDIAC DEFIBRILLATOR IN PLACE: ICD-10-CM

## 2018-08-07 PROCEDURE — 93295 DEV INTERROG REMOTE 1/2/MLT: CPT | Performed by: INTERNAL MEDICINE

## 2018-08-07 PROCEDURE — 93296 REM INTERROG EVL PM/IDS: CPT | Performed by: INTERNAL MEDICINE

## 2018-08-07 NOTE — LETTER
[facility] [facility] [phone]  [address] [place] [address]  [place]- [phone]    Pacemaker/Defibrillator Clinic          08/08/18        Evelin Lamar  [address] [address]        Dear Evelin Lamar    This letter is to inform you that we received the transmission from your monitor at home that checks your pacemaker and/or defibrillator, or implanted heart monitor. Your device shows normal function. The next date your monitor will automatically transmit will be 2-12-19. Please do not send additional routine transmissions unless specifically requested. Your device and monitor are wireless and most transmit cellularly, but please periodically check your monitor is still plugged in to the electrical outlet. If you still use the telephone land line to send please ensure the connection to the phone mike is secure. This will help to ensure successful automatic transmissions in the future. Also, the monitor needs to be close to you while sleeping at night. Please be aware that the remote device transmission sites are periodically monitored only during regular business hours during which simultaneous in-office device clinics are being run. If your transmission requires attention, we will contact you as soon as possible. Thank you.             [address]

## 2018-08-08 ENCOUNTER — TELEPHONE (OUTPATIENT)
Dept: CARDIOLOGY CLINIC | Age: 63
End: 2018-08-08

## 2018-08-08 DIAGNOSIS — I25.810 CORONARY ARTERY DISEASE INVOLVING CORONARY BYPASS GRAFT OF NATIVE HEART WITHOUT ANGINA PECTORIS: Primary | ICD-10-CM

## 2018-08-08 RX ORDER — ATORVASTATIN CALCIUM 20 MG/1
20 TABLET, FILM COATED ORAL DAILY
Qty: 90 TABLET | Refills: 3 | Status: SHIPPED | OUTPATIENT
Start: 2018-08-08 | End: 2019-07-25 | Stop reason: SDUPTHER

## 2018-08-08 NOTE — TELEPHONE ENCOUNTER
Spoke to patient's wife to clarify dosage of Lipitor. Patient is on 20 mg daily. E-scribed Lipitor 20 mg daily to [facility] for 90 day supply with 3 refills.

## 2018-11-05 ENCOUNTER — PROCEDURE VISIT (OUTPATIENT)
Dept: CARDIOLOGY CLINIC | Age: 63
End: 2018-11-05
Payer: MEDICARE

## 2018-11-05 ENCOUNTER — OFFICE VISIT (OUTPATIENT)
Dept: CARDIOLOGY CLINIC | Age: 63
End: 2018-11-05
Payer: MEDICARE

## 2018-11-05 ENCOUNTER — HOSPITAL ENCOUNTER (OUTPATIENT)
Age: 63
Discharge: HOME | End: 2018-11-05
Payer: MEDICARE

## 2018-11-05 VITALS
DIASTOLIC BLOOD PRESSURE: 68 MMHG | BODY MASS INDEX: 27.44 KG/M2 | HEIGHT: 71 IN | SYSTOLIC BLOOD PRESSURE: 126 MMHG | WEIGHT: 196 LBS | HEART RATE: 60 BPM

## 2018-11-05 DIAGNOSIS — Z95.810 AICD PRESENT, DOUBLE CHAMBER: ICD-10-CM

## 2018-11-05 DIAGNOSIS — I48.92 ATRIAL FLUTTER, UNSPECIFIED TYPE (HCC): ICD-10-CM

## 2018-11-05 DIAGNOSIS — Z79.899 ON AMIODARONE THERAPY: Primary | ICD-10-CM

## 2018-11-05 DIAGNOSIS — I05.9 MITRAL VALVE DISORDER: Chronic | ICD-10-CM

## 2018-11-05 DIAGNOSIS — I25.10 ATHEROSCLEROSIS OF NATIVE CORONARY ARTERY OF NATIVE HEART WITHOUT ANGINA PECTORIS: ICD-10-CM

## 2018-11-05 DIAGNOSIS — I25.2 OLD MI (MYOCARDIAL INFARCTION): ICD-10-CM

## 2018-11-05 DIAGNOSIS — I25.810 CORONARY ARTERY DISEASE INVOLVING CORONARY BYPASS GRAFT OF NATIVE HEART WITHOUT ANGINA PECTORIS: ICD-10-CM

## 2018-11-05 DIAGNOSIS — I25.5 ISCHEMIC CARDIOMYOPATHY: ICD-10-CM

## 2018-11-05 DIAGNOSIS — I63.9 CEREBROVASCULAR ACCIDENT (CVA), UNSPECIFIED MECHANISM (HCC): ICD-10-CM

## 2018-11-05 DIAGNOSIS — I10 ESSENTIAL HYPERTENSION, BENIGN: Chronic | ICD-10-CM

## 2018-11-05 DIAGNOSIS — Z79.899 ON AMIODARONE THERAPY: ICD-10-CM

## 2018-11-05 DIAGNOSIS — I47.20 VENTRICULAR TACHYCARDIA: ICD-10-CM

## 2018-11-05 LAB
ALBUMIN SERPL-MCNC: 4.6 G/DL (ref 3.4–5)
ALP BLD-CCNC: 104 U/L (ref 40–129)
ALT SERPL-CCNC: 20 U/L (ref 10–40)
ANION GAP SERPL CALCULATED.3IONS-SCNC: 13 MMOL/L (ref 3–16)
AST SERPL-CCNC: 19 U/L (ref 15–37)
BILIRUB SERPL-MCNC: 0.4 MG/DL (ref 0–1)
BILIRUBIN DIRECT: <0.2 MG/DL (ref 0–0.3)
BILIRUBIN, INDIRECT: NORMAL MG/DL (ref 0–1)
BUN BLDV-MCNC: 19 MG/DL (ref 7–20)
CALCIUM SERPL-MCNC: 10 MG/DL (ref 8.3–10.6)
CHLORIDE BLD-SCNC: 101 MMOL/L (ref 99–110)
CO2: 27 MMOL/L (ref 21–32)
CREAT SERPL-MCNC: 1.3 MG/DL (ref 0.8–1.3)
GFR AFRICAN AMERICAN: >60
GFR NON-AFRICAN AMERICAN: 56
GLUCOSE BLD-MCNC: 80 MG/DL (ref 70–99)
PHOSPHORUS: 3.1 MG/DL (ref 2.5–4.9)
POTASSIUM SERPL-SCNC: 4.5 MMOL/L (ref 3.5–5.1)
SODIUM BLD-SCNC: 141 MMOL/L (ref 136–145)
TOTAL PROTEIN: 7.3 G/DL (ref 6.4–8.2)
TSH REFLEX: 2.15 UIU/ML (ref 0.27–4.2)

## 2018-11-05 PROCEDURE — 36415 COLL VENOUS BLD VENIPUNCTURE: CPT

## 2018-11-05 PROCEDURE — 80048 BASIC METABOLIC PNL TOTAL CA: CPT

## 2018-11-05 PROCEDURE — 84443 ASSAY THYROID STIM HORMONE: CPT

## 2018-11-05 PROCEDURE — 80076 HEPATIC FUNCTION PANEL: CPT

## 2018-11-05 PROCEDURE — 93283 PRGRMG EVAL IMPLANTABLE DFB: CPT | Performed by: INTERNAL MEDICINE

## 2018-11-05 PROCEDURE — 84100 ASSAY OF PHOSPHORUS: CPT

## 2018-11-05 PROCEDURE — 99214 OFFICE O/P EST MOD 30 MIN: CPT | Performed by: INTERNAL MEDICINE

## 2018-11-05 RX ORDER — AMIODARONE HYDROCHLORIDE 200 MG/1
200 TABLET ORAL DAILY
Qty: 180 TABLET | Refills: 3 | Status: SHIPPED | OUTPATIENT
Start: 2018-11-05 | End: 2018-11-05 | Stop reason: SDUPTHER

## 2018-11-05 RX ORDER — AMIODARONE HYDROCHLORIDE 200 MG/1
200 TABLET ORAL DAILY
Qty: 90 TABLET | Refills: 3 | Status: SHIPPED | OUTPATIENT
Start: 2018-11-05 | End: 2019-10-08 | Stop reason: SDUPTHER

## 2018-11-05 NOTE — PROGRESS NOTES
[address]   Electrophysiology   Date: 11/5/2018  I had the privilege of visiting Loan Victor in the office. HPI: Loan Victor is a 60 y.o. male being seen today in follow up [address] significant for CAD, MI s/p CABG x 2, HTN, HLD and history endocarditis s/p MV repair, CVA (2007)with residual left sided weakness, ventricular tachycardia (s/p cardioversion in ER for VT), s/p AICD implantation on 8/27/15. He has had atrial flutter on his interrogation of device. Xarelto prescribed but patient has not started it yet due to the cost and risk of bleeding. He is well aware of risk of stroke.      Last device interrogation noted that he has had multiple episodes of ventricular tachycardia requiring ATP. Device interrogation 1/30/18 revealed 35 episodes of ventricular tachycardia. Interval History: Mr Leanna Austin states feeling well cardiac standpoint. Denies any fluttering, palpitations, SOB, CP. Continues with amiodarone therapy without complications. Still ambulates with cane with left sided weakness from stroke. Past Medical History:   Diagnosis Date    CAD (coronary artery disease)     [facility]     Hyperlipidemia     Hypertension     MI ([facility]ocardial infarction) [facility]     VT (ventricular tachycardia) ([facility]         Past Surgical History:   Procedure Laterality Date    CARDIAC DEFIBRILLATOR PLACEMENT  8/27/15    dual chamber ICD, [employer]    CARDIAC SURGERY      cabg x 3, cabg ?  2 v second time    DIAGNOSTIC CARDIAC CATH LAB PROCEDURE       Allergies:  No Known Allergies    Medication:  Current Outpatient Prescriptions   Medication Sig Dispense Refill    amiodarone (CORDARONE) 200 MG tablet Take 1 tablet by mouth daily 90 tablet 3    atorvastatin (LIPITOR) 20 MG tablet Take 1 tablet by mouth daily 90 tablet 3    metoprolol succinate (TOPROL XL) 50 MG extended release tablet TAKE ONE TABLET BY MOUTH DAILY 90 tablet 3    lisinopril-hydrochlorothiazide 4 seconds     - Amio started 10/31/17    - Cr 1.2 (10/2017), AST 19 (1/2018), ALT 13 (1/2018), TSH 4 (1/2018)   - Continue BB and ACE-i.     - Echo-Mild to moderate mitral regurgitation is present, LVEF 45%     - Paroxysmal Atrial Flutter   - Diagnosed with device interrogation on 10/31/16 . He was asymptomatic.    - Patient has high [id number] score and requires anticoagulation to prevent thromboembolic events. History of stroke. - I have discussed anticoagulation to decrease the risk of thromboembolic.    - Prescription has been given previously   - He is well aware of risk of stroke, encouraged NOAC to reduce but he was not interested in taking anticoagulation, on ASA only   Device interrogation (11/5/2018) with no episodes of atrial fibrillation or VT recently     - CAD s/p CABG in the 1997 and 2008. - No angina.    - On ASA, BB and statin. - Cath on 8/21/15 and no intervention. Reviewed by interventional cardiology and no plan for cardiac cath. - Continue with aggressive medical therapy. - Myoview- medium sized inferolateral fixed defect of severe intensity consistend with infarction in the territory of the proximal to distal LCx or RCA   He has seen with interventional cardiology, no intervention has been recommended. - Ischemic cardiomyopathy, EF: 45% by recent echo   -  On Toprol XL and ACE-i.     - Significant hemodynamic mitral stenosis by echo:    Mean gradient of 5 mmHg with max gradient of 13 mmHg. Follows with Dr. Kentrell Curry. - HTN:  controlled. 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 (2007)    - History of smoking Marijuana   Smoking counselling. Plan:  No changes to medications (amiodarone reordered 200mg #90/3R)  Labwork: TSH, Hepatic, Renal (On amiodarone)  Continue with device checks  Follow up with EP NP, Chapis Campbell in 6 months    Thank you for allowing me to participate in the care of Tatum Barton.   Further

## 2018-11-05 NOTE — PATIENT INSTRUCTIONS
Patient Education        Atrial Fibrillation: Care Instructions  Your Care Instructions    Atrial fibrillation is an irregular and often fast heartbeat. Treating this condition is important for several reasons. It can cause blood clots, which can travel from your heart to your brain and cause a stroke. If you have a fast heartbeat, you may feel lightheaded, dizzy, and weak. An irregular heartbeat can also increase your risk for heart failure. Atrial fibrillation is often the result of another heart condition, such as high blood pressure or coronary artery disease. Making changes to improve your heart condition will help you stay healthy and active. Follow-up care is a key part of your treatment and safety. Be sure to make and go to all appointments, and call your doctor if you are having problems. It's also a good idea to know your test results and keep a list of the medicines you take. How can you care for yourself at home? Medicines    · Take your medicines exactly as prescribed. Call your doctor if you think you are having a problem with your medicine. You will get more details on the specific medicines your doctor prescribes.     · If your doctor has given you a blood thinner to prevent a stroke, be sure you get instructions about how to take your medicine safely. Blood thinners can cause serious bleeding problems.     · Do not take any vitamins, over-the-counter drugs, or herbal products without talking to your doctor first.    Lifestyle changes    · Do not smoke. Smoking can increase your chance of a stroke and heart attack. If you need help quitting, talk to your doctor about stop-smoking programs and medicines. These can increase your chances of quitting for good.     · Eat a heart-healthy diet.     · Stay at a healthy weight. Lose weight if you need to.     · Limit alcohol to 2 drinks a day for men and 1 drink a day for women. Too much alcohol can cause health problems.     · Avoid colds and flu.  Get a

## 2019-02-12 ENCOUNTER — NURSE ONLY (OUTPATIENT)
Dept: CARDIOLOGY CLINIC | Age: 64
End: 2019-02-12
Payer: MEDICARE

## 2019-02-12 DIAGNOSIS — Z95.810 CARDIAC DEFIBRILLATOR IN PLACE: ICD-10-CM

## 2019-02-12 DIAGNOSIS — I25.5 ISCHEMIC CARDIOMYOPATHY: ICD-10-CM

## 2019-02-12 PROCEDURE — 93295 DEV INTERROG REMOTE 1/2/MLT: CPT | Performed by: INTERNAL MEDICINE

## 2019-02-12 PROCEDURE — 93296 REM INTERROG EVL PM/IDS: CPT | Performed by: INTERNAL MEDICINE

## 2019-05-14 ENCOUNTER — NURSE ONLY (OUTPATIENT)
Dept: CARDIOLOGY CLINIC | Age: 64
End: 2019-05-14
Payer: MEDICARE

## 2019-05-14 DIAGNOSIS — Z95.810 CARDIAC DEFIBRILLATOR IN PLACE: ICD-10-CM

## 2019-05-14 DIAGNOSIS — I25.5 ISCHEMIC CARDIOMYOPATHY: ICD-10-CM

## 2019-05-14 PROCEDURE — 93295 DEV INTERROG REMOTE 1/2/MLT: CPT | Performed by: INTERNAL MEDICINE

## 2019-05-14 PROCEDURE — 93296 REM INTERROG EVL PM/IDS: CPT | Performed by: INTERNAL MEDICINE

## 2019-05-14 NOTE — LETTER
[facility] [facility] [phone]  [address]  [place]- [phone]    Pacemaker/Defibrillator Clinic          05/15/19        [address]  [address] [facility] [address]        Dear Thaddeus Barker    This letter is to inform you that we received the transmission from your monitor at home that checks your pacemaker and/or defibrillator, or implanted heart monitor. Your device shows normal function. The next date your monitor will automatically transmit will be 8/27/19. Your device and monitor are wireless and most transmit cellularly, but please periodically check your monitor is still plugged in to the electrical outlet. If you still use the telephone land line to send please ensure the connection to the phone mike is secure. This will help to ensure successful automatic transmissions in the future. Also, the monitor needs to be close to you while sleeping at night. Please be aware that the remote device transmission sites are periodically monitored only during regular business hours during which simultaneous in-office device clinics are being run. If your transmission requires attention, we will contact you as soon as possible. Thank you.             [address]

## 2019-06-12 ENCOUNTER — TELEPHONE (OUTPATIENT)
Dept: CARDIOLOGY CLINIC | Age: 64
End: 2019-06-12

## 2019-06-12 RX ORDER — LISINOPRIL AND HYDROCHLOROTHIAZIDE 12.5; 1 MG/1; MG/1
TABLET ORAL
Qty: 90 TABLET | Refills: 2 | Status: SHIPPED | OUTPATIENT
Start: 2019-06-12 | End: 2019-06-12

## 2019-06-12 RX ORDER — LISINOPRIL AND HYDROCHLOROTHIAZIDE 12.5; 1 MG/1; MG/1
1 TABLET ORAL DAILY
Qty: 90 TABLET | Refills: 0 | Status: SHIPPED | OUTPATIENT
Start: 2019-06-12 | End: 2019-10-08 | Stop reason: SINTOL

## 2019-06-12 NOTE — TELEPHONE ENCOUNTER
Medication Refill    When was your last appointment with cardiology?      (if  it's been more than 1 year, please go ahead and schedule an appointment with the physician while you have the patient on the phone)      Medication needing refilled:   Lisinopril     Doseage of the medication:    How are you taking this medication (QD, BID, TID, QID, PRN):    Patient want a 30 or 90 day supply called in:    Which Pharmacy are we sending the medication to    [facility] THIS WEEK  , HE IS GOING ON VACATION   Medication Question/Concern    (if  it's been more than 1 year, please go ahead and schedule an appointment with the physician while you have the patient on the phone)    What is the name of the medication you need to speak with someone about? Doseage of the medication:    How are you taking this medication:    What issues/concerns are you having with this medication:      Medication Samples    (if  it's been more than 1 year, please go ahead and schedule an appointment with the physician while you have the patient on the phone)    Medication:    Doseage of the medication:    How are you taking this medication (QD, BID, TID, QID, PRN):     in the office or Mail to your home?

## 2019-07-02 ENCOUNTER — TELEPHONE (OUTPATIENT)
Dept: CARDIOLOGY CLINIC | Age: 64
End: 2019-07-02

## 2019-07-02 RX ORDER — METOPROLOL SUCCINATE 50 MG/1
TABLET, EXTENDED RELEASE ORAL
Qty: 90 TABLET | Refills: 2 | Status: SHIPPED | OUTPATIENT
Start: 2019-07-02 | End: 2019-10-08 | Stop reason: SDUPTHER

## 2019-08-27 ENCOUNTER — NURSE ONLY (OUTPATIENT)
Dept: CARDIOLOGY CLINIC | Age: 64
End: 2019-08-27
Payer: MEDICARE

## 2019-08-27 DIAGNOSIS — Z95.810 CARDIAC DEFIBRILLATOR IN PLACE: ICD-10-CM

## 2019-08-27 DIAGNOSIS — I25.5 ISCHEMIC CARDIOMYOPATHY: ICD-10-CM

## 2019-08-27 PROCEDURE — 93295 DEV INTERROG REMOTE 1/2/MLT: CPT | Performed by: INTERNAL MEDICINE

## 2019-08-27 PROCEDURE — 93296 REM INTERROG EVL PM/IDS: CPT | Performed by: INTERNAL MEDICINE

## 2019-10-08 ENCOUNTER — NURSE ONLY (OUTPATIENT)
Dept: CARDIOLOGY CLINIC | Age: 64
End: 2019-10-08
Payer: MEDICARE

## 2019-10-08 ENCOUNTER — OFFICE VISIT (OUTPATIENT)
Dept: CARDIOLOGY CLINIC | Age: 64
End: 2019-10-08
Payer: MEDICARE

## 2019-10-08 VITALS
DIASTOLIC BLOOD PRESSURE: 50 MMHG | BODY MASS INDEX: 27.16 KG/M2 | OXYGEN SATURATION: 96 % | HEIGHT: 71 IN | SYSTOLIC BLOOD PRESSURE: 112 MMHG | HEART RATE: 60 BPM | WEIGHT: 194 LBS

## 2019-10-08 DIAGNOSIS — I25.5 ISCHEMIC CARDIOMYOPATHY: ICD-10-CM

## 2019-10-08 DIAGNOSIS — I48.3 TYPICAL ATRIAL FLUTTER (HCC): ICD-10-CM

## 2019-10-08 DIAGNOSIS — I25.810 CORONARY ARTERY DISEASE INVOLVING CORONARY BYPASS GRAFT OF NATIVE HEART WITHOUT ANGINA PECTORIS: Primary | ICD-10-CM

## 2019-10-08 DIAGNOSIS — I10 ESSENTIAL HYPERTENSION, BENIGN: ICD-10-CM

## 2019-10-08 DIAGNOSIS — I48.92 ATRIAL FLUTTER, UNSPECIFIED TYPE (HCC): ICD-10-CM

## 2019-10-08 DIAGNOSIS — Z95.810 AICD PRESENT, DOUBLE CHAMBER: ICD-10-CM

## 2019-10-08 DIAGNOSIS — I34.0 NONRHEUMATIC MITRAL VALVE REGURGITATION: ICD-10-CM

## 2019-10-08 DIAGNOSIS — I05.9 MITRAL VALVE DISORDER: Chronic | ICD-10-CM

## 2019-10-08 DIAGNOSIS — Z79.899 LONG-TERM USE OF HIGH-RISK MEDICATION: ICD-10-CM

## 2019-10-08 PROCEDURE — 99214 OFFICE O/P EST MOD 30 MIN: CPT | Performed by: NURSE PRACTITIONER

## 2019-10-08 PROCEDURE — 93000 ELECTROCARDIOGRAM COMPLETE: CPT | Performed by: NURSE PRACTITIONER

## 2019-10-08 PROCEDURE — 93283 PRGRMG EVAL IMPLANTABLE DFB: CPT | Performed by: INTERNAL MEDICINE

## 2019-10-08 RX ORDER — AMIODARONE HYDROCHLORIDE 200 MG/1
200 TABLET ORAL DAILY
Qty: 90 TABLET | Refills: 3 | Status: SHIPPED | OUTPATIENT
Start: 2019-10-08 | End: 2020-10-19

## 2019-10-08 RX ORDER — METOPROLOL SUCCINATE 50 MG/1
50 TABLET, EXTENDED RELEASE ORAL DAILY
Qty: 90 TABLET | Refills: 3 | Status: SHIPPED | OUTPATIENT
Start: 2019-10-08 | End: 2020-10-19

## 2019-10-08 RX ORDER — LISINOPRIL 5 MG/1
5 TABLET ORAL DAILY
Qty: 90 TABLET | Refills: 3 | Status: SHIPPED | OUTPATIENT
Start: 2019-10-08 | End: 2020-10-19

## 2019-10-08 RX ORDER — HYDROCHLOROTHIAZIDE 12.5 MG/1
12.5 TABLET ORAL EVERY MORNING
Qty: 90 TABLET | Refills: 3 | Status: SHIPPED | OUTPATIENT
Start: 2019-10-08 | End: 2019-11-20 | Stop reason: SINTOL

## 2019-10-30 ENCOUNTER — TELEPHONE (OUTPATIENT)
Dept: CARDIOLOGY CLINIC | Age: 64
End: 2019-10-30

## 2019-10-30 ENCOUNTER — HOSPITAL ENCOUNTER (OUTPATIENT)
Dept: NON INVASIVE DIAGNOSTICS | Age: 64
Discharge: HOME | End: 2019-10-30
Payer: MEDICARE

## 2019-10-30 ENCOUNTER — HOSPITAL ENCOUNTER (OUTPATIENT)
Age: 64
Discharge: HOME | End: 2019-10-30
Payer: MEDICARE

## 2019-10-30 DIAGNOSIS — I25.5 ISCHEMIC CARDIOMYOPATHY: Primary | ICD-10-CM

## 2019-10-30 DIAGNOSIS — I25.5 ISCHEMIC CARDIOMYOPATHY: ICD-10-CM

## 2019-10-30 LAB
A/G RATIO: 1.9 (ref 1.1–2.2)
ALBUMIN SERPL-MCNC: 5.1 G/DL (ref 3.4–5)
ALP BLD-CCNC: 114 U/L (ref 40–129)
ALT SERPL-CCNC: 18 U/L (ref 10–40)
AMIODARONE, SERUM: 1 MCG/ML (ref 1.5–2.5)
ANION GAP SERPL CALCULATED.3IONS-SCNC: 16 MMOL/L (ref 3–16)
AST SERPL-CCNC: 22 U/L (ref 15–37)
BILIRUB SERPL-MCNC: 0.5 MG/DL (ref 0–1)
BUN BLDV-MCNC: 31 MG/DL (ref 7–20)
CALCIUM SERPL-MCNC: 9.8 MG/DL (ref 8.3–10.6)
CHLORIDE BLD-SCNC: 100 MMOL/L (ref 99–110)
CO2: 26 MMOL/L (ref 21–32)
CREAT SERPL-MCNC: 1.5 MG/DL (ref 0.8–1.3)
GFR AFRICAN AMERICAN: 57
GFR NON-AFRICAN AMERICAN: 47
GLOBULIN: 2.7 G/DL
GLUCOSE BLD-MCNC: 94 MG/DL (ref 70–99)
LV EF: 45 %
LVEF MODALITY: NORMAL
N-DESETHYL-AMIODARONE: 0.9 MCG/ML
POTASSIUM SERPL-SCNC: 4.5 MMOL/L (ref 3.5–5.1)
SODIUM BLD-SCNC: 142 MMOL/L (ref 136–145)
T4 FREE: 1.1 NG/DL (ref 0.8–1.8)
TOTAL PROTEIN: 7.8 G/DL (ref 6.4–8.2)
TSH ULTRASENSITIVE: 2.77 MIU/L (ref 0.4–4.5)

## 2019-10-30 PROCEDURE — 36415 COLL VENOUS BLD VENIPUNCTURE: CPT

## 2019-10-30 PROCEDURE — 93306 TTE W/DOPPLER COMPLETE: CPT

## 2019-10-30 PROCEDURE — 80053 COMPREHEN METABOLIC PANEL: CPT

## 2019-10-31 ENCOUNTER — TELEPHONE (OUTPATIENT)
Dept: CARDIOLOGY CLINIC | Age: 64
End: 2019-10-31

## 2019-11-20 ENCOUNTER — OFFICE VISIT (OUTPATIENT)
Dept: CARDIOLOGY CLINIC | Age: 64
End: 2019-11-20
Payer: MEDICARE

## 2019-11-20 VITALS
BODY MASS INDEX: 27.06 KG/M2 | OXYGEN SATURATION: 97 % | DIASTOLIC BLOOD PRESSURE: 82 MMHG | HEART RATE: 60 BPM | WEIGHT: 194 LBS | SYSTOLIC BLOOD PRESSURE: 142 MMHG

## 2019-11-20 DIAGNOSIS — Z79.899 LONG-TERM USE OF HIGH-RISK MEDICATION: ICD-10-CM

## 2019-11-20 DIAGNOSIS — I05.9 MITRAL VALVE DISORDER: ICD-10-CM

## 2019-11-20 DIAGNOSIS — I48.3 TYPICAL ATRIAL FLUTTER (HCC): ICD-10-CM

## 2019-11-20 DIAGNOSIS — I25.810 CORONARY ARTERY DISEASE INVOLVING CORONARY BYPASS GRAFT OF NATIVE HEART WITHOUT ANGINA PECTORIS: Primary | ICD-10-CM

## 2019-11-20 DIAGNOSIS — I10 ESSENTIAL HYPERTENSION, BENIGN: ICD-10-CM

## 2019-11-20 PROBLEM — G81.94 LEFT HEMIPARESIS (HCC): Status: ACTIVE | Noted: 2019-11-20

## 2019-11-20 PROBLEM — I48.0 PAF (PAROXYSMAL ATRIAL FIBRILLATION) (HCC): Status: ACTIVE | Noted: 2018-06-12

## 2019-11-20 PROBLEM — M62.838 MUSCLE SPASTICITY: Status: ACTIVE | Noted: 2019-11-20

## 2019-11-20 PROCEDURE — 99214 OFFICE O/P EST MOD 30 MIN: CPT | Performed by: NURSE PRACTITIONER

## 2019-12-31 DIAGNOSIS — I25.810 CORONARY ARTERY DISEASE INVOLVING CORONARY BYPASS GRAFT OF NATIVE HEART WITHOUT ANGINA PECTORIS: ICD-10-CM

## 2019-12-31 RX ORDER — ATORVASTATIN CALCIUM 20 MG/1
TABLET, FILM COATED ORAL
Qty: 90 TABLET | Refills: 0 | Status: SHIPPED | OUTPATIENT
Start: 2019-12-31 | End: 2020-02-17 | Stop reason: SDUPTHER

## 2020-01-14 ENCOUNTER — NURSE ONLY (OUTPATIENT)
Dept: CARDIOLOGY CLINIC | Age: 65
End: 2020-01-14
Payer: MEDICARE

## 2020-01-14 PROCEDURE — 93296 REM INTERROG EVL PM/IDS: CPT | Performed by: INTERNAL MEDICINE

## 2020-01-14 PROCEDURE — 93295 DEV INTERROG REMOTE 1/2/MLT: CPT | Performed by: INTERNAL MEDICINE

## 2020-02-17 RX ORDER — ATORVASTATIN CALCIUM 20 MG/1
20 TABLET, FILM COATED ORAL DAILY
Qty: 90 TABLET | Refills: 1 | Status: SHIPPED | OUTPATIENT
Start: 2020-02-17 | End: 2020-04-17 | Stop reason: SDUPTHER

## 2020-02-17 NOTE — TELEPHONE ENCOUNTER
RX APPROVAL:      Refill:   Requested Prescriptions      No prescriptions requested or ordered in this encounter      Last OV: 11/20/2019   Last EKG:    Last Labs:Results in Care Everywhere.    Most recent [id number] [facility] results list below: Ez  Lab Results   Component Value Date    CHOL 149 08/31/2016    TRIG 68 08/31/2016    HDL 53 08/31/2016    LDLCALC 82 08/31/2016     Lab Results   Component Value Date    ALT 18 10/30/2019    AST 22 10/30/2019       Plan and labs reviewed

## 2020-03-11 ENCOUNTER — TELEPHONE (OUTPATIENT)
Dept: CARDIOLOGY CLINIC | Age: 65
End: 2020-03-11

## 2020-03-11 DIAGNOSIS — I10 ESSENTIAL HYPERTENSION: ICD-10-CM

## 2020-03-11 DIAGNOSIS — I25.5 ISCHEMIC CARDIOMYOPATHY: ICD-10-CM

## 2020-03-11 LAB
ANION GAP SERPL CALCULATED.3IONS-SCNC: 14 MMOL/L (ref 3–16)
BUN BLDV-MCNC: 19 MG/DL (ref 7–20)
CALCIUM SERPL-MCNC: 9.5 MG/DL (ref 8.3–10.6)
CHLORIDE BLD-SCNC: 103 MMOL/L (ref 99–110)
CO2: 23 MMOL/L (ref 21–32)
CREAT SERPL-MCNC: 1.5 MG/DL (ref 0.8–1.3)
GFR AFRICAN AMERICAN: 57
GFR NON-AFRICAN AMERICAN: 47
GLUCOSE BLD-MCNC: 98 MG/DL (ref 70–99)
POTASSIUM SERPL-SCNC: 4.6 MMOL/L (ref 3.5–5.1)
SODIUM BLD-SCNC: 140 MMOL/L (ref 136–145)

## 2020-03-11 NOTE — TELEPHONE ENCOUNTER
Spouse called for order of BMP lab that  is in the system but is not viewable on our end. She states that the lab was ordered and then cancelled. Order placed, patient is at the lab now for lab draw.

## 2020-03-13 ENCOUNTER — TELEPHONE (OUTPATIENT)
Dept: CARDIOLOGY CLINIC | Age: 65
End: 2020-03-13

## 2020-04-15 ENCOUNTER — NURSE ONLY (OUTPATIENT)
Dept: CARDIOLOGY CLINIC | Age: 65
End: 2020-04-15
Payer: MEDICARE

## 2020-04-15 PROCEDURE — 93295 DEV INTERROG REMOTE 1/2/MLT: CPT | Performed by: INTERNAL MEDICINE

## 2020-04-15 PROCEDURE — 93296 REM INTERROG EVL PM/IDS: CPT | Performed by: INTERNAL MEDICINE

## 2020-04-17 ENCOUNTER — OFFICE VISIT (OUTPATIENT)
Dept: CARDIOLOGY CLINIC | Age: 65
End: 2020-04-17
Payer: MEDICARE

## 2020-04-17 VITALS
HEIGHT: 71 IN | DIASTOLIC BLOOD PRESSURE: 80 MMHG | WEIGHT: 196.2 LBS | BODY MASS INDEX: 27.47 KG/M2 | RESPIRATION RATE: 18 BRPM | HEART RATE: 60 BPM | SYSTOLIC BLOOD PRESSURE: 138 MMHG | TEMPERATURE: 98.2 F | OXYGEN SATURATION: 96 %

## 2020-04-17 PROCEDURE — 99214 OFFICE O/P EST MOD 30 MIN: CPT | Performed by: INTERNAL MEDICINE

## 2020-04-17 RX ORDER — SILDENAFIL 50 MG/1
50 TABLET, FILM COATED ORAL DAILY PRN
Qty: 6 TABLET | Refills: 3 | Status: SHIPPED | OUTPATIENT
Start: 2020-04-17 | End: 2020-10-21

## 2020-04-17 RX ORDER — ATORVASTATIN CALCIUM 20 MG/1
20 TABLET, FILM COATED ORAL DAILY
Qty: 90 TABLET | Refills: 1 | Status: SHIPPED | OUTPATIENT
Start: 2020-04-17 | End: 2020-11-24 | Stop reason: SDUPTHER

## 2020-07-15 ENCOUNTER — NURSE ONLY (OUTPATIENT)
Dept: CARDIOLOGY CLINIC | Age: 65
End: 2020-07-15
Payer: MEDICARE

## 2020-07-15 PROCEDURE — 93296 REM INTERROG EVL PM/IDS: CPT | Performed by: INTERNAL MEDICINE

## 2020-07-15 PROCEDURE — 93295 DEV INTERROG REMOTE 1/2/MLT: CPT | Performed by: INTERNAL MEDICINE

## 2020-07-15 NOTE — LETTER
[address] [phone]  Luige William [address]- [address] [phone]    Pacemaker/Defibrillator Clinic          07/15/20        [address]  [address] [address]        Dear Tammi Connolly    This letter is to inform you that we received the transmission from your monitor at home that checks your implanted heart device. The next date your monitor will automatically transmit will be 10-19-20. If your report needs attention we will notify you. Your device and monitor are wireless and most transmit cellularly, but please periodically check your monitor is still plugged in to the electrical outlet. If you still use the telephone land line to send please ensure the connection to the phone mike is secure. This will help to ensure successful automatic transmissions in the future. Also, the monitor needs to be close to you while sleeping at night. Please be aware that the remote device transmission sites are periodically monitored only during regular business hours during which simultaneous in-office device clinics are being run. If your transmission requires attention, we will contact you as soon as possible. Thank you.             [address]

## 2020-10-19 ENCOUNTER — NURSE ONLY (OUTPATIENT)
Dept: CARDIOLOGY CLINIC | Age: 65
End: 2020-10-19
Payer: MEDICARE

## 2020-10-19 PROCEDURE — 93295 DEV INTERROG REMOTE 1/2/MLT: CPT | Performed by: INTERNAL MEDICINE

## 2020-10-19 PROCEDURE — 93296 REM INTERROG EVL PM/IDS: CPT | Performed by: INTERNAL MEDICINE

## 2020-10-19 RX ORDER — LISINOPRIL 5 MG/1
TABLET ORAL
Qty: 90 TABLET | Refills: 0 | Status: SHIPPED | OUTPATIENT
Start: 2020-10-19 | End: 2020-10-21 | Stop reason: SDUPTHER

## 2020-10-19 RX ORDER — METOPROLOL SUCCINATE 50 MG/1
TABLET, EXTENDED RELEASE ORAL
Qty: 90 TABLET | Refills: 0 | Status: SHIPPED | OUTPATIENT
Start: 2020-10-19 | End: 2020-10-21

## 2020-10-19 RX ORDER — AMIODARONE HYDROCHLORIDE 200 MG/1
TABLET ORAL
Qty: 90 TABLET | Refills: 0 | Status: SHIPPED | OUTPATIENT
Start: 2020-10-19 | End: 2021-01-18

## 2020-10-19 NOTE — LETTER
[address] [facility] [phone]  [address]- [phone]    Pacemaker/Defibrillator Clinic          10/20/20        [address]  [address] [facility] [address]        Dear Aaron German    This letter is to inform you that we received the transmission from your monitor at home that checks your implanted heart device. The next date your monitor will automatically transmit will be 1-19-21. If your report needs attention we will notify you. Your device and monitor are wireless and most transmit cellularly, but please periodically check your monitor is still plugged in to the electrical outlet. If you still use the telephone land line to send please ensure the connection to the phone mike is secure. This will help to ensure successful automatic transmissions in the future. Also, the monitor needs to be close to you while sleeping at night. Please be aware that the remote device transmission sites are periodically monitored only during regular business hours during which simultaneous in-office device clinics are being run. If your transmission requires attention, we will contact you as soon as possible. Thank you.             [address]

## 2020-10-20 NOTE — PROGRESS NOTES
We received remote transmission from patient's monitor at home. Transmission shows normal sensing and pacing function. EP physician will review. See interrogation under cardiology tab in the [address] [facility] [address] field for more details. Optivol is within normal range.

## 2020-10-21 ENCOUNTER — HOSPITAL ENCOUNTER (OUTPATIENT)
Age: 65
Discharge: HOME | End: 2020-10-21
Payer: MEDICARE

## 2020-10-21 ENCOUNTER — OFFICE VISIT (OUTPATIENT)
Dept: CARDIOLOGY CLINIC | Age: 65
End: 2020-10-21
Payer: MEDICARE

## 2020-10-21 VITALS
DIASTOLIC BLOOD PRESSURE: 72 MMHG | HEART RATE: 60 BPM | OXYGEN SATURATION: 98 % | BODY MASS INDEX: 26.56 KG/M2 | WEIGHT: 189.7 LBS | SYSTOLIC BLOOD PRESSURE: 152 MMHG | HEIGHT: 71 IN

## 2020-10-21 LAB
A/G RATIO: 1.4 (ref 1.1–2.2)
ALBUMIN SERPL-MCNC: 4.4 G/DL (ref 3.4–5)
ALP BLD-CCNC: 114 U/L (ref 40–129)
ALT SERPL-CCNC: 19 U/L (ref 10–40)
ANION GAP SERPL CALCULATED.3IONS-SCNC: 10 MMOL/L (ref 3–16)
AST SERPL-CCNC: 20 U/L (ref 15–37)
BILIRUB SERPL-MCNC: 0.7 MG/DL (ref 0–1)
BUN BLDV-MCNC: 22 MG/DL (ref 7–20)
CALCIUM SERPL-MCNC: 9.6 MG/DL (ref 8.3–10.6)
CHLORIDE BLD-SCNC: 104 MMOL/L (ref 99–110)
CHOLESTEROL, FASTING: 161 MG/DL (ref 0–199)
CO2: 26 MMOL/L (ref 21–32)
CREAT SERPL-MCNC: 2 MG/DL (ref 0.8–1.3)
GFR AFRICAN AMERICAN: 41
GFR NON-AFRICAN AMERICAN: 34
GLOBULIN: 3.2 G/DL
GLUCOSE BLD-MCNC: 96 MG/DL (ref 70–99)
HDLC SERPL-MCNC: 65 MG/DL (ref 40–60)
LDL CHOLESTEROL CALCULATED: 83 MG/DL
POTASSIUM SERPL-SCNC: 4.9 MMOL/L (ref 3.5–5.1)
SODIUM BLD-SCNC: 140 MMOL/L (ref 136–145)
T4 FREE: 1.5 NG/DL (ref 0.9–1.8)
TOTAL PROTEIN: 7.6 G/DL (ref 6.4–8.2)
TRIGLYCERIDE, FASTING: 67 MG/DL (ref 0–150)
TSH REFLEX: 2.6 UIU/ML (ref 0.27–4.2)
VLDLC SERPL CALC-MCNC: 13 MG/DL

## 2020-10-21 PROCEDURE — 99214 OFFICE O/P EST MOD 30 MIN: CPT | Performed by: NURSE PRACTITIONER

## 2020-10-21 PROCEDURE — 80053 COMPREHEN METABOLIC PANEL: CPT

## 2020-10-21 PROCEDURE — 93000 ELECTROCARDIOGRAM COMPLETE: CPT | Performed by: NURSE PRACTITIONER

## 2020-10-21 PROCEDURE — 84443 ASSAY THYROID STIM HORMONE: CPT

## 2020-10-21 PROCEDURE — 84439 ASSAY OF FREE THYROXINE: CPT

## 2020-10-21 PROCEDURE — 80299 QUANTITATIVE ASSAY DRUG: CPT

## 2020-10-21 PROCEDURE — 80061 LIPID PANEL: CPT

## 2020-10-21 PROCEDURE — 36415 COLL VENOUS BLD VENIPUNCTURE: CPT

## 2020-10-21 RX ORDER — LISINOPRIL 5 MG/1
TABLET ORAL
Qty: 90 TABLET | Refills: 3 | Status: SHIPPED | OUTPATIENT
Start: 2020-10-21 | End: 2020-12-24 | Stop reason: SINTOL

## 2020-10-21 RX ORDER — METOPROLOL SUCCINATE 100 MG/1
100 TABLET, EXTENDED RELEASE ORAL DAILY
Qty: 90 TABLET | Refills: 3 | Status: SHIPPED | OUTPATIENT
Start: 2020-10-21 | End: 2021-07-01

## 2020-10-21 NOTE — PROGRESS NOTES
[facility]     Outpatient Follow Up Note    Kady Nicole is 71 y.o. male who presents today with a history of MI / CAD s/p CABG , redo , endocarditis mitral valve s/p mitral valve repair , HTN and hyperlipidemia;  ventricular tachycardia (s/p cardioversion in ER for VT), s/p AICD implantation on 8/27/15 and atrial flutter (Xarelto prescribed but patient has not started due to the cost and risk of bleeding. He is well aware of risk of stroke). His other hx includes: CVA () with residual left sided weakness, factor V Leiden, DVT and PE ' (refused AC)     CHIEF COMPLAINT / HPI:  Follow Up secondary to CAD    Subjective:     he denies significant chest pain. There is no SOB/ELLISON. The patient denies orthopnea/PND. The patient does not have swelling. The patients weight is stable  The patient is not experiencing palpitations or dizziness. These symptoms show no change since the last OV. With regard to medication therapy the patient has been compliant with prescribed regimen. They have tolerated therapy to date.      Past Medical History:   Diagnosis Date    CAD (coronary artery disease)     [facility] [facility]     Hyperlipidemia     Hypertension     [facility] infarction[facility]     [facility]      Social History:    Social History     Tobacco Use   Smoking Status Former Smoker    Years: 40.00    Last attempt to quit: 2015    Years since quittin.2   Smokeless Tobacco Never Used     Current Medications:  Current Outpatient Medications   Medication Sig Dispense Refill    amiodarone (CORDARONE) 200 MG tablet TAKE 1 TABLET BY MOUTH ONE TIME A DAY  90 tablet 0    metoprolol succinate (TOPROL XL) 50 MG extended release tablet TAKE 1 TABLET BY MOUTH ONE TIME A DAY  90 tablet 0    lisinopril (PRINIVIL;ZESTRIL) 5 MG tablet TAKE 1 TABLET BY MOUTH ONE TIME A DAY  90 tablet 0    atorvastatin (LIPITOR) 20 MG tablet Take 1 tablet by mouth daily 90 tablet 1    aspirin 81 MG EC tablet Take 81 mg by mouth daily        No current facility-administered medications for this visit. REVIEW OF SYSTEMS:    CONSTITUTIONAL: No major weight gain or loss, fatigue, weakness, night sweats or fever. HEENT: No new vision difficulties or ringing in the ears. RESPIRATORY: No new SOB, PND, orthopnea or cough. CARDIOVASCULAR: See HPI  GI: No nausea, vomiting, diarrhea, constipation, abdominal pain or changes in bowel habits. : No urinary frequency, urgency, incontinence hematuria or dysuria. SKIN: No cyanosis or skin lesions. MUSCULOSKELETAL: No new muscle or joint pain; uses medical marijuana for back/neck spasms. NEUROLOGICAL: No syncope or TIA-like symptoms. PSYCHIATRIC: No anxiety, pain, insomnia or depression    Objective:   PHYSICAL EXAM:       Vitals:    10/21/20 1125 10/21/20 1151   BP: (!) 150/90 (!) 152/72   Site: Right Upper Arm    Position: Sitting    Cuff Size: Large Adult    Pulse: 60    SpO2: 98%    Weight: 189 lb 11.2 oz (86 kg)    Height: 5' 11\" (1.803 m)         VITALS:  BP (!) 150/90 (Site: Right Upper Arm, Position: Sitting, Cuff Size: Large Adult)   Pulse 60   Ht 5' 11\" (1.803 m)   Wt 189 lb 11.2 oz (86 kg)   SpO2 98%   BMI 26.46 kg/m²   CONSTITUTIONAL: Cooperative, no apparent distress, and appears well nourished / developed  NEUROLOGIC:  Awake and orientated to person, place and time; Lt sided weakness. PSYCH: Calm affect. SKIN: Warm and dry. HEENT: Sclera non-icteric, normocephalic, neck supple, no elevation of JVP, normal carotid pulses with no bruits and thyroid normal size. LUNGS:  No increased work of breathing and clear to auscultation, no crackles or wheezing  CARDIOVASCULAR:  Regular rate 64 and rhythm with + murmur 4th & 5th ICS Lt MCL, gallops, rubs, or abnormal heart sounds, normal PMI. The apical impulses not displaced  JVP less than 8 cm H2O  Heart tones are crisp and normal  Cervical veins are not '19    3. Essential hypertension, benign   ~suboptimal in office today; no longer checks at home    4. Typical atrial flutter (HCC)   ~Atrial paced   ~metoprolol / ASA / amiodarone   ~TSH 2.77 on labs from Oct '19  ~CHADsVASc 4    5. Mixed hyperlipidemia   ~controlled on last profile : Aug '19:  trig 87 HDL 64 LDL 97   ~atorvastatin     I had the opportunity to review the clinical symptoms and presentation of Opal Orr. Plan:     1. EKG: A-paced 60  2. Amiodarone level / TSH, free T-4 as routine   CMP/lipid profile  3. Increase metoprolol to 100 mg daily : may take as 50 mg bid for BP control   ~he will check his BP weekly and call after 4 weeks with his readings  4. F/U in 6 weeks / BP dependent (if improved-controlled, then f/u in six months)    Overall the patient is stable from CV standpoint    I have addresed the patient's cardiac risk factors and adjusted pharmacologic treatment as needed. In addition, I have reinforced the need for patient directed risk factor modification. Further evaluation will be based upon the patient's clinical course and testing results. All questions and concerns were addressed to the patient/wife, Ruby Chavis. Alternatives to my treatment were discussed. The patient is not currently smoking. The risks related to smoking were reviewed with the patient. Recommend maintaining a smoke-free lifestyle. Patient is on a beta-blocker  Patient is on an ace-i  Patient is on a statin    Antiplatelet therapy has been recommended / prescribed for this patient. Education conducted on adverse reactions including bleeding was discussed. The patient verbalizes understanding not to stop medications without discussing with us. Discussed exercise  Discussed Low saturated fat diet. Thank you for allowing to us to participate in the care of Opal Orr.     Panchito Ramey, ALEX    Documentation of today's visit sent to PCP

## 2020-10-22 ENCOUNTER — TELEPHONE (OUTPATIENT)
Dept: CARDIOLOGY CLINIC | Age: 65
End: 2020-10-22

## 2020-10-22 NOTE — TELEPHONE ENCOUNTER
----- Message from ALEX Barrett CNP sent at 10/22/2020  7:37 AM EDT -----  Thyroid and lipids looks good    Creatinine up : have him decrease his lisinopril to 1/2 tablet or 2.5 mg daily and recheck BMP after one week

## 2020-10-25 LAB
AMIODARONE LEVEL: 1 UG/ML (ref 0.5–2)
DES-AMIOD: 1 UG/ML

## 2020-10-26 ENCOUNTER — TELEPHONE (OUTPATIENT)
Dept: CARDIOLOGY CLINIC | Age: 65
End: 2020-10-26

## 2020-10-26 NOTE — TELEPHONE ENCOUNTER
Noah Bertrand, APRN - CNP  P [facility] Staff               Thyroid and lipids looks good       Creatinine up : have him decrease his lisinopril to 1/2 tablet or 2.5 mg daily and recheck BMP after one week

## 2020-11-24 ENCOUNTER — TELEPHONE (OUTPATIENT)
Dept: CARDIOLOGY CLINIC | Age: 65
End: 2020-11-24

## 2020-11-24 RX ORDER — ATORVASTATIN CALCIUM 20 MG/1
20 TABLET, FILM COATED ORAL DAILY
Qty: 90 TABLET | Refills: 1 | Status: SHIPPED | OUTPATIENT
Start: 2020-11-24 | End: 2021-06-07 | Stop reason: SDUPTHER

## 2020-11-24 NOTE — TELEPHONE ENCOUNTER
He's been checking his BP since decreasing lisinopril :     130-150/ ;    inst to get labs re-drawn as recommended     Will reassess at his appt coming up

## 2020-11-24 NOTE — TELEPHONE ENCOUNTER
I spoke with pt wife and relayed that pt will need blood work - after the decrease in Lisinopril. She was instructed that pt needs blood work again. She stated that his recent BP's running a little high- systolic in the 873'Y, but 11/4 was last it was taken. . She agreed to keep a BP log for the next few days and call us with readings later if no appt needed. When do you want to see pt 6 wks or 6 months?

## 2020-11-24 NOTE — TELEPHONE ENCOUNTER
Medication Refill    Medication needing refilled: atorvastatin     Dosage of the medication:    How are you taking this medication (QD, BID, TID, QID, PRN):    30 or 90 day supply called in:    Which Pharmacy are we sending the medication to?:  [address]

## 2020-12-07 DIAGNOSIS — Z79.899 LONG-TERM USE OF HIGH-RISK MEDICATION: ICD-10-CM

## 2020-12-07 LAB
ANION GAP SERPL CALCULATED.3IONS-SCNC: 13 MMOL/L (ref 3–16)
BUN BLDV-MCNC: 23 MG/DL (ref 7–20)
CALCIUM SERPL-MCNC: 9.2 MG/DL (ref 8.3–10.6)
CHLORIDE BLD-SCNC: 103 MMOL/L (ref 99–110)
CO2: 24 MMOL/L (ref 21–32)
CREAT SERPL-MCNC: 2 MG/DL (ref 0.8–1.3)
GFR AFRICAN AMERICAN: 41
GFR NON-AFRICAN AMERICAN: 34
GLUCOSE BLD-MCNC: 110 MG/DL (ref 70–99)
POTASSIUM SERPL-SCNC: 4.3 MMOL/L (ref 3.5–5.1)
SODIUM BLD-SCNC: 140 MMOL/L (ref 136–145)

## 2020-12-23 DIAGNOSIS — I10 ESSENTIAL HYPERTENSION: ICD-10-CM

## 2020-12-23 DIAGNOSIS — R79.89 CREATININE ELEVATION: ICD-10-CM

## 2020-12-23 LAB
ANION GAP SERPL CALCULATED.3IONS-SCNC: 11 MMOL/L (ref 3–16)
BUN BLDV-MCNC: 25 MG/DL (ref 7–20)
CALCIUM SERPL-MCNC: 9.6 MG/DL (ref 8.3–10.6)
CHLORIDE BLD-SCNC: 104 MMOL/L (ref 99–110)
CO2: 26 MMOL/L (ref 21–32)
CREAT SERPL-MCNC: 2 MG/DL (ref 0.8–1.3)
GFR AFRICAN AMERICAN: 41
GFR NON-AFRICAN AMERICAN: 34
GLUCOSE BLD-MCNC: 97 MG/DL (ref 70–99)
POTASSIUM SERPL-SCNC: 5 MMOL/L (ref 3.5–5.1)
SODIUM BLD-SCNC: 141 MMOL/L (ref 136–145)

## 2020-12-24 ENCOUNTER — TELEPHONE (OUTPATIENT)
Dept: CARDIOLOGY CLINIC | Age: 65
End: 2020-12-24

## 2020-12-24 RX ORDER — AMLODIPINE BESYLATE 5 MG/1
5 TABLET ORAL DAILY
Qty: 90 TABLET | Refills: 1 | Status: SHIPPED | OUTPATIENT
Start: 2020-12-24 | End: 2021-06-07 | Stop reason: SDUPTHER

## 2020-12-24 NOTE — TELEPHONE ENCOUNTER
----- Message from ALEX Hutton CNP sent at 12/24/2020  9:48 AM EST -----  Labs unchanged, stop lisinopril and if BP elevated, will start norvasc 5mg once a day, recheck labs in 2 weeks.  Fabrice Pollack

## 2020-12-24 NOTE — TELEPHONE ENCOUNTER
Called pt about the message below and they verbalized understanding. Medication was already sent and patient was told to only take 1/2 tab once a day.

## 2020-12-24 NOTE — TELEPHONE ENCOUNTER
I only want to add it if his BP is elevated after stopping lisinopril. Norvasc does not affect the kidneys but untreated HTN is harmful to kidney fx.  Afshin Mcgee

## 2020-12-24 NOTE — TELEPHONE ENCOUNTER
Called pt about the message below he stated that his BP is 159/76 and HR: 60 please advise if you want the medication sent to the pharmacy.  Thank you

## 2021-01-01 NOTE — PATIENT INSTRUCTIONS
Increase metoprolol to 100 mg daily for better BP control; you can take 1/2 tablet twice a day if preferred    Check your BP weekly and call me after 4 weeks with your readings    Labs today as routine     appt in six months Make sure to feed at least every 2 hours even at night.  Follow-up for weight check is very important.

## 2021-01-18 RX ORDER — AMIODARONE HYDROCHLORIDE 200 MG/1
TABLET ORAL
Qty: 90 TABLET | Refills: 0 | Status: SHIPPED | OUTPATIENT
Start: 2021-01-18 | End: 2021-04-20

## 2021-01-19 ENCOUNTER — NURSE ONLY (OUTPATIENT)
Dept: CARDIOLOGY CLINIC | Age: 66
End: 2021-01-19
Payer: MEDICARE

## 2021-01-19 DIAGNOSIS — Z95.810 CARDIAC DEFIBRILLATOR IN PLACE: Primary | ICD-10-CM

## 2021-01-19 DIAGNOSIS — I25.5 ISCHEMIC CARDIOMYOPATHY: ICD-10-CM

## 2021-01-19 PROCEDURE — 93296 REM INTERROG EVL PM/IDS: CPT | Performed by: INTERNAL MEDICINE

## 2021-01-19 PROCEDURE — 93295 DEV INTERROG REMOTE 1/2/MLT: CPT | Performed by: INTERNAL MEDICINE

## 2021-01-19 NOTE — LETTER
[facility] [facility] [phone]  [address] [phone]    Pacemaker/Defibrillator Clinic          01/19/21        [address]  [address] [facility] [address]        Dear Yessi Munguia    This letter is to inform you that we received the transmission from your monitor at home that checks your implanted heart device. The next date your monitor will automatically transmit will be 4-21-21. If your report needs attention we will notify you. Your device and monitor are wireless and most transmit cellularly, but please periodically check your monitor is still plugged in to the electrical outlet. If you still use the telephone land line to send please ensure the connection to the phone mkie is secure. This will help to ensure successful automatic transmissions in the future. Also, the monitor needs to be close to you while sleeping at night. Please be aware that the remote device transmission sites are periodically monitored only during regular business hours during which simultaneous in-office device clinics are being run. If your transmission requires attention, we will contact you as soon as possible. Thank you.             [address]

## 2021-04-20 RX ORDER — AMIODARONE HYDROCHLORIDE 200 MG/1
TABLET ORAL
Qty: 90 TABLET | Refills: 0 | Status: SHIPPED | OUTPATIENT
Start: 2021-04-20 | End: 2021-06-07 | Stop reason: ALTCHOICE

## 2021-04-20 NOTE — TELEPHONE ENCOUNTER
Received refill request for amiodarone from [facility] [address]     Last ov:10/21/2020 NPTS    Last labs:amiodarone, tsh, cmp 10/21/2020    Last EKG:10/21/2020    Last Refill:#90 with 0 refills 1/18/2021    Next appointment:on recall list with LES

## 2021-04-21 ENCOUNTER — NURSE ONLY (OUTPATIENT)
Dept: CARDIOLOGY CLINIC | Age: 66
End: 2021-04-21
Payer: MEDICARE

## 2021-04-21 DIAGNOSIS — Z95.810 CARDIAC DEFIBRILLATOR IN PLACE: ICD-10-CM

## 2021-04-21 DIAGNOSIS — I47.20 VENTRICULAR TACHYCARDIA: ICD-10-CM

## 2021-04-21 NOTE — PROGRESS NOTES
We received remote transmission from patient's monitor at home. Transmission shows normal sensing and pacing function. Pt had VT back in February requiring ATP. EP physician will review. See interrogation under cardiology tab in the [address] [place] [address] [address] field for more details. Optivol is within normal range.

## 2021-04-23 PROCEDURE — 93295 DEV INTERROG REMOTE 1/2/MLT: CPT | Performed by: INTERNAL MEDICINE

## 2021-04-23 PROCEDURE — 93296 REM INTERROG EVL PM/IDS: CPT | Performed by: INTERNAL MEDICINE

## 2021-04-24 NOTE — RESULT ENCOUNTER NOTE
Leanne,   Patient with VT episode treated with ATP. Has not followed with EP since 2018. Have him seen by NP in office.

## 2021-04-26 ENCOUNTER — TELEPHONE (OUTPATIENT)
Dept: CARDIOLOGY CLINIC | Age: 66
End: 2021-04-26

## 2021-04-26 NOTE — TELEPHONE ENCOUNTER
----- Message from Nick Finn MD sent at 4/23/2021  9:53 PM EDT -----  Donovan Camarena,   Patient with VT episode treated with ATP. Has not followed with EP since 2018. Have him seen by NP in office.

## 2021-06-07 ENCOUNTER — OFFICE VISIT (OUTPATIENT)
Dept: CARDIOLOGY CLINIC | Age: 66
End: 2021-06-07
Payer: MEDICARE

## 2021-06-07 ENCOUNTER — PROCEDURE VISIT (OUTPATIENT)
Dept: CARDIOLOGY CLINIC | Age: 66
End: 2021-06-07
Payer: MEDICARE

## 2021-06-07 VITALS
DIASTOLIC BLOOD PRESSURE: 70 MMHG | BODY MASS INDEX: 24.27 KG/M2 | OXYGEN SATURATION: 96 % | HEIGHT: 71 IN | HEART RATE: 60 BPM | WEIGHT: 173.4 LBS | SYSTOLIC BLOOD PRESSURE: 140 MMHG

## 2021-06-07 DIAGNOSIS — I48.3 TYPICAL ATRIAL FLUTTER (HCC): ICD-10-CM

## 2021-06-07 DIAGNOSIS — I05.9 MITRAL VALVE DISORDER: Chronic | ICD-10-CM

## 2021-06-07 DIAGNOSIS — I25.5 ISCHEMIC CARDIOMYOPATHY: ICD-10-CM

## 2021-06-07 DIAGNOSIS — I25.810 CORONARY ARTERY DISEASE INVOLVING CORONARY BYPASS GRAFT OF NATIVE HEART WITHOUT ANGINA PECTORIS: Primary | ICD-10-CM

## 2021-06-07 DIAGNOSIS — E78.2 MIXED HYPERLIPIDEMIA: ICD-10-CM

## 2021-06-07 DIAGNOSIS — I05.9 MITRAL VALVE DISORDER: ICD-10-CM

## 2021-06-07 DIAGNOSIS — I10 ESSENTIAL HYPERTENSION: ICD-10-CM

## 2021-06-07 DIAGNOSIS — I25.810 CORONARY ARTERY DISEASE INVOLVING CORONARY BYPASS GRAFT OF NATIVE HEART WITHOUT ANGINA PECTORIS: ICD-10-CM

## 2021-06-07 LAB
LV EF: 48 %
LVEF MODALITY: NORMAL

## 2021-06-07 PROCEDURE — 93306 TTE W/DOPPLER COMPLETE: CPT | Performed by: INTERNAL MEDICINE

## 2021-06-07 PROCEDURE — 99214 OFFICE O/P EST MOD 30 MIN: CPT | Performed by: INTERNAL MEDICINE

## 2021-06-07 RX ORDER — ATORVASTATIN CALCIUM 20 MG/1
20 TABLET, FILM COATED ORAL DAILY
Qty: 90 TABLET | Refills: 1 | Status: SHIPPED | OUTPATIENT
Start: 2021-06-07 | End: 2021-12-10

## 2021-06-07 RX ORDER — AMLODIPINE BESYLATE 5 MG/1
5 TABLET ORAL DAILY
Qty: 90 TABLET | Refills: 1 | Status: SHIPPED | OUTPATIENT
Start: 2021-06-07 | End: 2021-07-01

## 2021-06-07 RX ORDER — SILDENAFIL 100 MG/1
100 TABLET, FILM COATED ORAL DAILY PRN
Qty: 10 TABLET | Refills: 3 | Status: SHIPPED | OUTPATIENT
Start: 2021-06-07

## 2021-06-07 NOTE — PROGRESS NOTES
[address]   Cardiac Follow Up     Referring Provider:  Jonnie Pittman MD     Chief Complaint   Patient presents with    1 Year Follow Up    Coronary Artery Disease        History of Present Illness:  Elly Carpenter is a 76 y.o. 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 He is well aware of risk of stroke). 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 He reports that he has felt well and denies exertional chest pain, palpitations, light-headedness, edema. His wife is with him for the visit. He has some shortness of breath with exertion, he does not feel heart racing. Past Medical History:   has a past medical history of CAD (coronary artery disease), CVA (cerebrovascular accident) ([address] Hyperlipidemia, Hypertension, MI (myocardial infarction) ([address] and VT (ventricular tachycardia) ([facility] [facility] Surgical History:   has a past surgical history that includes Diagnostic Cardiac Cath Lab Procedure; Cardiac surgery; and Cardiac defibrillator placement (8/27/15). Social History:   reports that he quit smoking about 5 years ago. He quit after 40.00 years of use. He has never used smokeless tobacco. He reports current alcohol use. He reports current drug use. Drug: Marijuana. Family History:  family history includes Heart Disease in his father and mother. Home Medications:  Prior to Admission medications    Medication Sig Start Date End Date Taking?  Authorizing Provider   amiodarone (CORDARONE) 200 MG tablet TAKE 1 TABLET BY MOUTH DAILY 4/20/21  Yes Neftaly Norman MD   amLODIPine (NORVASC) 5 MG tablet Take 1 tablet by mouth and expansion without use of accessory muscles  · Resp Auscultation: Normal breath sounds without dullness  Cardiovascular:  · The apical impulses not displaced  · Heart tones are crisp and normal  · Cervical veins are not engorged  · The carotid upstroke is normal in amplitude and contour without delay or bruit  · 2/6 Holosystolic Murmur   · Peripheral pulses are symmetrical and full  · There is no clubbing, cyanosis of the extremities. · No edema  · Femoral Arteries: 2+ and equal  · Pedal Pulses: 2+ and equal   Abdomen:  · No masses or tenderness  · Liver/Spleen: No Abnormalities Noted  Neurological/Psychiatric:  · Alert and oriented in all spheres  · Moves all extremities well  · Left arm paralyzed,limping gait  · No abnormalities of mood, affect, memory, mentation, or behavior are noted      Assessment:    1. Coronary artery disease involving coronary bypass graft of native heart without angina pectoris   ~stable : offers no c/o angina  ~s/p CABG '93, redo '08  ~fixed defect on myoview '17     2. Mitral valve disorder   ~hx of endocarditis   ~s/p mitral valve repair  ~stable on echo today   Echo yearly      3. Essential hypertension, benign   ~controlled   Blood pressure (!) 140/70, pulse 60, height 5' 11\" (1.803 m), weight 173 lb 6.4 oz (78.7 kg), SpO2 96 %.          4. Typical atrial flutter (HCC)   ~controlled AP       5. Mixed hyperlipidemia              ~controlled               ~atorvastatin       Plan: Echo 6/7/21 read by me in office today and results discussed with the patient. Harry Cornelius has a stable cardiac status. Cardiac test and lab results personally reviewed by me during this office visit and discussed. Stop amiodarone. Continue risk factor modifications. Call for any change in symptoms. Return for regular follow up in 6 months with labs prior. I appreciate the opportunity of cooperating in the care of this individual.    Gia Dee.  Dottie Danielson M.D., [facility]    Patient's problem list, medications, allergies, past medical, surgical, social and family histories were reviewed and updated as appropriate. Scribe's attestation: This note was scribed in the presence of Dr. Stephanie Purvis MD, by Avril Smith RN. The scribe's documentation has been prepared under my direction and personally reviewed by me in its entirety. I confirm that the note above accurately reflects all work, treatment, procedures, and medical decision making performed by me.

## 2021-07-01 ENCOUNTER — NURSE ONLY (OUTPATIENT)
Dept: CARDIOLOGY CLINIC | Age: 66
End: 2021-07-01
Payer: MEDICARE

## 2021-07-01 ENCOUNTER — OFFICE VISIT (OUTPATIENT)
Dept: CARDIOLOGY CLINIC | Age: 66
End: 2021-07-01
Payer: MEDICARE

## 2021-07-01 VITALS
OXYGEN SATURATION: 98 % | DIASTOLIC BLOOD PRESSURE: 82 MMHG | BODY MASS INDEX: 25.59 KG/M2 | SYSTOLIC BLOOD PRESSURE: 152 MMHG | HEART RATE: 60 BPM | HEIGHT: 71 IN | WEIGHT: 182.8 LBS

## 2021-07-01 DIAGNOSIS — I25.5 ISCHEMIC CARDIOMYOPATHY: ICD-10-CM

## 2021-07-01 DIAGNOSIS — I47.20 VENTRICULAR TACHYCARDIA: ICD-10-CM

## 2021-07-01 DIAGNOSIS — I48.3 TYPICAL ATRIAL FLUTTER (HCC): ICD-10-CM

## 2021-07-01 DIAGNOSIS — I47.20 VENTRICULAR TACHYCARDIA: Primary | ICD-10-CM

## 2021-07-01 DIAGNOSIS — I25.810 CORONARY ARTERY DISEASE INVOLVING CORONARY BYPASS GRAFT OF NATIVE HEART WITHOUT ANGINA PECTORIS: ICD-10-CM

## 2021-07-01 DIAGNOSIS — I10 ESSENTIAL HYPERTENSION: ICD-10-CM

## 2021-07-01 DIAGNOSIS — Z95.810 CARDIAC DEFIBRILLATOR IN PLACE: ICD-10-CM

## 2021-07-01 DIAGNOSIS — Z95.810 AICD PRESENT, DOUBLE CHAMBER: ICD-10-CM

## 2021-07-01 DIAGNOSIS — I48.0 PAF (PAROXYSMAL ATRIAL FIBRILLATION) (HCC): ICD-10-CM

## 2021-07-01 PROCEDURE — 99214 OFFICE O/P EST MOD 30 MIN: CPT | Performed by: NURSE PRACTITIONER

## 2021-07-01 PROCEDURE — 93283 PRGRMG EVAL IMPLANTABLE DFB: CPT | Performed by: INTERNAL MEDICINE

## 2021-07-01 RX ORDER — AMLODIPINE BESYLATE 2.5 MG/1
2.5 TABLET ORAL DAILY
Qty: 90 TABLET | Refills: 1 | COMMUNITY
Start: 2021-07-01 | End: 2021-07-02 | Stop reason: SDUPTHER

## 2021-07-01 RX ORDER — AMLODIPINE BESYLATE 2.5 MG/1
2.5 TABLET ORAL DAILY
COMMUNITY
End: 2021-07-01 | Stop reason: ALTCHOICE

## 2021-07-01 RX ORDER — METOPROLOL SUCCINATE 100 MG/1
150 TABLET, EXTENDED RELEASE ORAL DAILY
Qty: 135 TABLET | Refills: 1 | Status: SHIPPED | OUTPATIENT
Start: 2021-07-01 | End: 2022-01-13

## 2021-07-01 NOTE — PATIENT INSTRUCTIONS
- Increase metoprolol to 150 mg nightly  - Check your blood pressure at home, if your top number blood pressure is >150/90 or <90/50 please call the office  - Call office is symptoms worsen  - Weigh yourself daily, if you gain more than 3 lbs in a day or 5 lbs in a week call the office  - Limit sodium intake to 2,000 mg daily   - Follow up with with electrophysiology

## 2021-07-01 NOTE — PROGRESS NOTES
[facility]   Electrophysiology  Trever Loaiza, APRN-CNP  Attending EP: Dr. Deloris Vu   Date: 7/1/2021  I had the privilege of visiting Radha Luther in the office. Chief Complaint:   Chief Complaint   Patient presents with    Atrial Fibrillation    Tachycardia    Hypertension     History of Present Illness: History obtained from patient and medical record. Radha Luther is 76 y.o. male with a past medical history of hypertension, hyperlipidemia, CAD s/p CABG x2, Factor V Leiden, endocarditis s/p MV repair, CVA 2007,  VT s/p AICD 8/27/2015 and atrial flutter. Placed on Xarelto, however he declined AC, risk of thromboembolism has been discussed. Ischemic CMP with EF 45-50%. He has had frequent ventricular tachycardia requiring ATP 35 episodes back in 2018 and he is on amiodarone therapy. Interval history: Today, Radha Luther is being seen for ventricular tachycardia and device monitoring. Devic interrogation shows HAMMAD 3.4 yrs, he had one pac terminated VF episode on 2/4/2021. He does not recall any symptoms or illness around this time. No recent blood work. Feeling well from cardiac standpoint. Azucena CP, SOB, swelling, palpitation. Hypertensive in office today. He has not been checking BP at home. Does not monitor sodium intake. Activity limited by residual effects of stroke. He is at baseline. Denies having chest pain, palpitations, shortness of breath, orthopnea or dizziness at the time of this visit. With regard to medication therapy the patient has been compliant with prescribed regimen. He has tolerated therapy to date.       Assessment:  Ventricular tachycardia/Implantable device   - S/p AICD 8/27/2015   - At this point he is PM dependent with underlying HR 38 BPM on medical therapy   - The CIED was interrogated and I reviewed all data    - Device interrogation today shows HAMMAD 3.4 yrs, AT/AF 0, AP 94.5%,  <0.1%  Paroxysmal Atrial Flutter  - ECG today shows AP  - Continue Toprol 100 daily   - [id number] score: 3 (age, hypertension, CAD) ; [id number] Vasc score and anticoagulation discussed. High risk for stroke and thromboembolism. Anticoagulation is recommended.   ~ R/B/A has been discussed and he will continue off AC; no new episodes  - Afib risk factors including age, HTN, obesity, inactivity and LORENA were discussed with patient. Risk factor modification recommended   ~ TSH 2.6 (10/21/2020)     - Treatment options including cardioversion, rate control strategy, antiarrhythmics, anticoagulation and possible ablation were discussed with patient. Risks, benefits and alternative of each treatment options were explained. All questions answered    ~ Dependent on recurrence and burden  HTN-goal <130/80   - Uncontrolled   - Increase Toprol and monitor at home   - Encouraged patient to check BP at home, log and bring to office visits  - Discussed lifestyle modifications, weight loss, low sodium diet  CAD   - S/p CABG x2   - No reports of angina   - Continue medical therapy   Hx of CVA   - Residual weakness  Plan  - Increase Toprol to 150 mg nightly   - Monitor BP at home and call if elevated >150/90  - Call if symptoms develop  - limit sodium intake  - Added CMP, CBC, mag    F/U: Follow-up with EP in 9 months RMM  -Follow up with device clinic as scheduled  -Call [facility] at [phone] with any questions    Allergies:  No Known Allergies  Home Medications:  Prior to Visit Medications    Medication Sig Taking?  Authorizing Provider   sildenafil (VIAGRA) 100 MG tablet Take 1 tablet by mouth daily as needed for Erectile Dysfunction  Zhane Burnette MD   atorvastatin (LIPITOR) 20 MG tablet Take 1 tablet by mouth daily  Zhane Burnette MD   amLODIPine (NORVASC) 5 MG tablet Take 1 tablet by mouth daily  Zhane Burnette MD   metoprolol succinate (TOPROL XL) 100 MG extended release tablet Take 1 tablet by mouth daily  ALEX Shea - CNP   aspirin 81 MG EC tablet Take 81 mg by mouth daily   Historical Provider, MD      Past Medical History:  Past Medical History:   Diagnosis Date    CAD (coronary artery disease)     [facility]A (cerebrovascular [facility]) ([facility] [place] [address]     Hyperlipidemia     Hypertension     MI (myocardial infarction) ([facility] [place] [address]     [facility]      Past Surgical History:    has a past surgical history that includes Diagnostic Cardiac Cath Lab Procedure; Cardiac surgery; and Cardiac defibrillator placement (8/27/15). Social History:  Reviewed. reports that he quit smoking about 5 years ago. He quit after 40.00 years of use. He has never used smokeless tobacco. He reports current alcohol use. He reports current drug use. Drug: Marijuana. Family History:  Reviewed. family history includes Heart Disease in his father and mother. Denies family history of sudden cardiac death, arrhythmia, premature CAD    Review of System:  · Constitutional: No weight changes or weakness  · HEENT: No visual changes. No mouth sores or sore throat. · Cardiovascular: denies chest pain, denies dyspnea on exertion, denies palpitations or denies loss of consciousness. No cough, hemoptysis, denies pleuritic pain, or phlebitis. denies dizziness. · Respiratory: denies cough or wheezing. · Gastrointestinal: Negative, No blood in stools. · Genitourinary: No hematuria. · Neurological: No focal weakness  · Psychiatric: No confusion, anxiety, or depression. · Hem/Lymph: Denies abnormal bruising or bleeding. Physical Examination:  There were no vitals filed for this visit. Wt Readings from Last 3 Encounters:   06/07/21 173 lb 6.4 oz (78.7 kg)   10/21/20 189 lb 11.2 oz (86 kg)   04/17/20 196 lb 3.2 oz (89 kg)      Constitutional: Cooperative and in no apparent distress, and appears well nourished   Skin: Warm and pink; no cyanosis or bruising   HEENT: Symmetric and normocephalic. Conjunctiva pink with clear sclera.  Mucus membranes pink and moist. No visible masses/goiter   Respiratory: Respirations symmetric and unlabored. Lungs clear to auscultation bilaterally, no wheezing, rhonchi, or crackles.  Cardiovascular:  regular rate and rhythm. S1 & S2 present, negative for murmur. negative elevation of JVP. Trace LE edema.  Musculoskeletal:  No focal weakness.  Neurological/Psych: Awake and orientated to person, place and time. Calm affect, appropriate mood. Pertinent labs, diagnostic, device, and imaging results reviewed as a part of this visit    LABS    CBC:   Lab Results   Component Value Date    WBC 8.9 2016    HGB 15.0 2016    HCT 44.6 2016    MCV 90.1 2016     2016     BMP:   Lab Results   Component Value Date    CREATININE 2.0 (H) 2020    BUN 25 (H) 2020     2020    K 5.0 2020     2020    CO2 26 2020     CrCl cannot be calculated (Patient's most recent lab result is older than the maximum 120 days allowed. ). No results found for: BNP    Thyroid:   Lab Results   Component Value Date    TSH 2.77 10/25/2019    C2KSRAT 7.90 2016     Lipid Panel:   Lab Results   Component Value Date    CHOL 149 2016    HDL 65 10/21/2020    TRIG 68 2016     LFTs:  Lab Results   Component Value Date    ALT 19 10/21/2020    AST 20 10/21/2020    ALKPHOS 114 10/21/2020    BILITOT 0.7 10/21/2020     Coags:   Lab Results   Component Value Date    APTT 51.8 (H) 2015     EC2021 AP HR 60, , , QTc 470    Echo: 2021  Summary   -Left ventricular cavity size is mildly dilated. Normal left ventricular   wall thickness.   -Overall left ventricular systolic function appears mildly reduced with an   ejection fraction in the 45-50% range.   -There is hypokinesis of the inferior walls.   -Indeterminate diastolic function due to mitral ring. E/e\"=19.65.   -An annuloplasty ring is seen in the mitral position.   -Mild mitral regurgitation.   -Aortic valve appears sclerotic but opens adequately. -Trivial tricuspid regurgitation.   -Estimated pulmonary artery systolic pressure is normal at 31 mmHg assuming   a right atrial pressure of 8 mmHg. -The left atrium is mildly dilated. -Pacemaker / ICD lead is visualized in the right heart. 10/30/2019  Summary   Mildly reduced global systolic function with an ejection fraction estimated   at 45%. Inferior wall hypokinesis noted. Indeterminate diastolic function. An annuloplasty ring is seen in the mitral position. The peak mitral valve velocity is estimated at 1.81 m/s with a mean pressure   gradient of 6 mmHg. There is mild mitral regurgitation. Aortic valve appears sclerotic but opens adequately. Pacer / ICD wire is visualized in the right ventricle. Trivial tricuspid regurgitation. GXT: 11/9/2017  Summary    -Medium sized inferolateral fixed defect of severe intensity consistent with    infarction in the territory of the proximal to distal LCx and/or RCA .  -There is no evidence of stress induced ischemia.    -LV function is moderately reduced with inferior lateral akinesis and    ejection fraction of 41%.        Recommendation    Discussed with pt and wife.   Jeremie Howell will f/u soon with Dr. Asiya Quinteros for questions surrounding antiarrhythmic    therapy     Cath: none available    Diet & Exercise:   The patient is counseled to follow a low salt diet to assure blood pressure remains controlled for cardiovascular risk factor modification   The patient is counseled to avoid excess caffeine, and energy drinks as this may exacerbated ectopy and arrhythmia   The patient is counseled to lose weight to control cardiovascular risk factors   Exercise program discussed: To improve overall cardiovascular health, the patient is instructed to increase cardiovascular related activities with a goal of 150 min/week of moderate level activity or 10,000 steps per day.  Encouraged to perform as much activity as tolerated     I have addressed the patient's cardiac risk factors and adjusted pharmacologic treatment as needed. In addition, I have reinforced the need for patient directed risk factor modification. I independently reviewed the device check interrogation and ECG    All questions and concerns were addressed with the patient. Alternatives to treatment were discussed. Thank you for allowing to us to participate in the care of Elissa Terrazas.     Patton Collet, APRN-CNP  [address]   Office: [phone]

## 2021-07-01 NOTE — PROGRESS NOTES
Patient comes in for programming evaluation for her defibrillator. All sensing and pacing parameters are within normal range. Battery life 3.4 years  AP 94.5%.  <0.1%. 1 VF pace-terminated episode noted on 2/4/2021 lasting 23 seconds with a Max V rate of 207 bpm.   PVC Runs (2-4 beats) 0.5 per hour  PVC Singles  46.7 per hour  Patient remains on metoprolol. No changes need to be made at this time. Please see interrogation for more detail. Optivol is within normal range. Patient will see Simon Isaac today and follow up in 3 months in office or remotely.

## 2021-07-02 ENCOUNTER — TELEPHONE (OUTPATIENT)
Dept: CARDIOLOGY CLINIC | Age: 66
End: 2021-07-02

## 2021-07-02 RX ORDER — AMLODIPINE BESYLATE 2.5 MG/1
2.5 TABLET ORAL DAILY
Qty: 90 TABLET | Refills: 1 | Status: SHIPPED | OUTPATIENT
Start: 2021-07-02 | End: 2021-12-30

## 2021-07-02 NOTE — TELEPHONE ENCOUNTER
Spoke to patient and his wife and they will go to a [facility] site in [facility]. Can they wait until October right before the appointment with LES? And the Amlodipine refill was not sent can we resend?   Please advise

## 2021-07-19 RX ORDER — AMIODARONE HYDROCHLORIDE 200 MG/1
TABLET ORAL
Qty: 90 TABLET | Refills: 0 | OUTPATIENT
Start: 2021-07-19

## 2021-07-19 RX ORDER — LISINOPRIL 5 MG/1
TABLET ORAL
Qty: 90 TABLET | Refills: 0 | OUTPATIENT
Start: 2021-07-19

## 2021-07-19 NOTE — TELEPHONE ENCOUNTER
Received refill request for Amiodarone  from [facility].     Dr. Amy Sanderosn medication at [address] on 6/7/21

## 2021-07-21 ENCOUNTER — NURSE ONLY (OUTPATIENT)
Dept: CARDIOLOGY CLINIC | Age: 66
End: 2021-07-21

## 2021-09-17 ENCOUNTER — TELEPHONE (OUTPATIENT)
Dept: CARDIOLOGY CLINIC | Age: 66
End: 2021-09-17

## 2021-09-17 NOTE — TELEPHONE ENCOUNTER
----- Message from ALEX Dougherty CNP sent at 9/17/2021  9:13 AM EDT -----  Please call patient and let him know I reviewed his blood work and it is not significantly changed. He has kidney disease at normal.  He should follow with PCP or kidney doctor for this.       HARMAN Dougherty

## 2021-09-17 NOTE — TELEPHONE ENCOUNTER
I spoke to patient with lab results per NPAL . Patient verbalized understanding. Advised patient to call back with any questions.

## 2021-10-28 NOTE — PROGRESS NOTES
[address]   Cardiac Follow Up     Referring Provider:  Isidra Alfred MD     Chief Complaint   Patient presents with    Atrial Fibrillation    Hypertension    Coronary Artery Disease    Cardiomyopathy        History of Present Illness:  Jarrell Barry is a 76 y.o. 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 He is well aware of risk of stroke). 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 He reports that he has felt well and denies exertional chest pain, palpitations, light-headedness, edema. His wife is with him for the visit. He has some shortness of breath with exertion, he does not feel heart racing. Recent device check reviewed. Past Medical History:   has a past medical history of CAD (coronary artery disease), CVA (cerebrovascular accident) ([address] Hyperlipidemia, Hypertension, MI (myocardial infarction) ([address] and VT (ventricular tachycardia) ([address] Surgical History:   has a past surgical history that includes Diagnostic Cardiac Cath Lab Procedure; Cardiac surgery; and Cardiac defibrillator placement (8/27/15). Social History:   reports that he quit smoking about 6 years ago. He quit after 40.00 years of use. He has never used smokeless tobacco. He reports current alcohol use. He reports current drug use. Drug: Marijuana Sahra Cruz). Family History:  family history includes Heart Disease in his father and mother. Home Medications:  Prior to Admission medications    Medication Sig Start Date End Date Taking?  Authorizing Provider   amLODIPine (NORVASC) 2.5 MG tablet Take 1 tablet by mouth daily 7/2/21  Yes without lesions  HEENT: EOMI ,normal  Neck:no JVD    Respiratory:  · Normal excursion and expansion without use of accessory muscles  · Resp Auscultation: Normal breath sounds without dullness  Cardiovascular:  · The apical impulses not displaced  · Heart tones are crisp and normal  · Cervical veins are not engorged  · The carotid upstroke is normal in amplitude and contour without delay or bruit  · 2/6 Holosystolic Murmur   · Peripheral pulses are symmetrical and full  · There is no clubbing, cyanosis of the extremities. · No edema  · Femoral Arteries: 2+ and equal  · Pedal Pulses: 2+ and equal   Abdomen:  · No masses or tenderness  · Liver/Spleen: No Abnormalities Noted  Neurological/Psychiatric:  · Alert and oriented in all spheres  · Moves all extremities well  · Left arm paralyzed,limping gait  · No abnormalities of mood, affect, memory, mentation, or behavior are noted      Assessment:    1. Coronary artery disease involving coronary bypass graft of native heart without angina pectoris   ~stable : offers no c/o angina  ~s/p CABG '93, redo '08  ~fixed defect on myoview '17     2. Mitral valve disorder   ~hx of endocarditis   ~s/p mitral valve repair  ~stable on echo 6/21   Echo yearly      3. Essential hypertension, benign   ~controlled   Blood pressure (!) 162/80, pulse 60, height 5' 11\" (1.803 m), weight 197 lb (89.4 kg), SpO2 98 %. recheck by me 140/72        4. Typical atrial flutter (HCC)   ~controlled AP       5. Mixed hyperlipidemia              ~controlled               ~atorvastatin       Plan:   Tristian Jaramillo has a stable cardiac status. Cardiac test and lab results personally reviewed by me during this office visit and discussed. No med changes   Continue risk factor modifications. Call for any change in symptoms. Return for regular follow up in 6 months with echo. I appreciate the opportunity of cooperating in the care of this individual.    Kenny Cleveland.  Maxi Galindo M.D., [facility]    Patient's problem list, medications, allergies, past medical, surgical, social and family histories were reviewed and updated as appropriate. Scribe's attestation: This note was scribed in the presence of Dr. Matthew Montoya MD, by Luz Medrano RN. The scribe's documentation has been prepared under my direction and personally reviewed by me in its entirety. I confirm that the note above accurately reflects all work, treatment, procedures, and medical decision making performed by me.

## 2021-11-15 ENCOUNTER — NURSE ONLY (OUTPATIENT)
Dept: CARDIOLOGY CLINIC | Age: 66
End: 2021-11-15
Payer: MEDICARE

## 2021-11-15 DIAGNOSIS — I47.20 VENTRICULAR TACHYCARDIA: ICD-10-CM

## 2021-11-15 DIAGNOSIS — Z95.810 CARDIAC DEFIBRILLATOR IN PLACE: ICD-10-CM

## 2021-11-16 PROCEDURE — 93296 REM INTERROG EVL PM/IDS: CPT | Performed by: INTERNAL MEDICINE

## 2021-11-16 PROCEDURE — 93295 DEV INTERROG REMOTE 1/2/MLT: CPT | Performed by: INTERNAL MEDICINE

## 2021-11-22 ENCOUNTER — OFFICE VISIT (OUTPATIENT)
Dept: CARDIOLOGY CLINIC | Age: 66
End: 2021-11-22
Payer: MEDICARE

## 2021-11-22 VITALS
OXYGEN SATURATION: 98 % | BODY MASS INDEX: 27.58 KG/M2 | WEIGHT: 197 LBS | SYSTOLIC BLOOD PRESSURE: 140 MMHG | DIASTOLIC BLOOD PRESSURE: 72 MMHG | HEIGHT: 71 IN | HEART RATE: 60 BPM

## 2021-11-22 DIAGNOSIS — I05.9 MITRAL VALVE DISORDER: ICD-10-CM

## 2021-11-22 DIAGNOSIS — I25.810 CORONARY ARTERY DISEASE INVOLVING CORONARY BYPASS GRAFT OF NATIVE HEART WITHOUT ANGINA PECTORIS: Primary | ICD-10-CM

## 2021-11-22 DIAGNOSIS — E78.2 MIXED HYPERLIPIDEMIA: ICD-10-CM

## 2021-11-22 DIAGNOSIS — I48.0 PAF (PAROXYSMAL ATRIAL FIBRILLATION) (HCC): ICD-10-CM

## 2021-11-22 DIAGNOSIS — Z98.890 HX OF MITRAL VALVE REPAIR: ICD-10-CM

## 2021-11-22 DIAGNOSIS — R06.02 SOB (SHORTNESS OF BREATH): ICD-10-CM

## 2021-11-22 DIAGNOSIS — E78.5 DYSLIPIDEMIA: ICD-10-CM

## 2021-11-22 PROCEDURE — 99214 OFFICE O/P EST MOD 30 MIN: CPT | Performed by: INTERNAL MEDICINE

## 2021-12-10 DIAGNOSIS — I25.810 CORONARY ARTERY DISEASE INVOLVING CORONARY BYPASS GRAFT OF NATIVE HEART WITHOUT ANGINA PECTORIS: ICD-10-CM

## 2021-12-10 RX ORDER — ATORVASTATIN CALCIUM 20 MG/1
TABLET, FILM COATED ORAL
Qty: 90 TABLET | Refills: 0 | Status: SHIPPED | OUTPATIENT
Start: 2021-12-10 | End: 2022-03-11

## 2021-12-30 RX ORDER — AMLODIPINE BESYLATE 2.5 MG/1
TABLET ORAL
Qty: 90 TABLET | Refills: 0 | Status: SHIPPED | OUTPATIENT
Start: 2021-12-30 | End: 2022-03-11

## 2022-01-13 RX ORDER — METOPROLOL SUCCINATE 100 MG/1
TABLET, EXTENDED RELEASE ORAL
Qty: 135 TABLET | Refills: 0 | Status: SHIPPED | OUTPATIENT
Start: 2022-01-13 | End: 2022-01-17

## 2022-01-17 RX ORDER — METOPROLOL SUCCINATE 100 MG/1
TABLET, EXTENDED RELEASE ORAL
Qty: 135 TABLET | Refills: 0 | Status: SHIPPED | OUTPATIENT
Start: 2022-01-17 | End: 2022-04-19

## 2022-01-17 NOTE — TELEPHONE ENCOUNTER
Received refill request for Metoprolol from [facility].      Last OV: 07/01/2021 w/ NPAL     Last Refill: 01/13/2022 #135 w/ 0 refills transmission to pharmacy failed     Next Appointment: 03/29/202 w/ ESPERANZA

## 2022-02-14 ENCOUNTER — NURSE ONLY (OUTPATIENT)
Dept: CARDIOLOGY CLINIC | Age: 67
End: 2022-02-14
Payer: MEDICARE

## 2022-02-14 DIAGNOSIS — Z95.810 CARDIAC DEFIBRILLATOR IN PLACE: ICD-10-CM

## 2022-02-14 DIAGNOSIS — I25.5 ISCHEMIC CARDIOMYOPATHY: ICD-10-CM

## 2022-02-14 PROCEDURE — 93295 DEV INTERROG REMOTE 1/2/MLT: CPT | Performed by: INTERNAL MEDICINE

## 2022-02-14 PROCEDURE — 93296 REM INTERROG EVL PM/IDS: CPT | Performed by: INTERNAL MEDICINE

## 2022-03-11 DIAGNOSIS — I25.810 CORONARY ARTERY DISEASE INVOLVING CORONARY BYPASS GRAFT OF NATIVE HEART WITHOUT ANGINA PECTORIS: ICD-10-CM

## 2022-03-11 RX ORDER — AMLODIPINE BESYLATE 2.5 MG/1
TABLET ORAL
Qty: 90 TABLET | Refills: 0 | Status: SHIPPED | OUTPATIENT
Start: 2022-03-11 | End: 2022-03-29

## 2022-03-11 RX ORDER — ATORVASTATIN CALCIUM 20 MG/1
TABLET, FILM COATED ORAL
Qty: 90 TABLET | Refills: 0 | Status: SHIPPED | OUTPATIENT
Start: 2022-03-11 | End: 2022-06-27

## 2022-03-11 NOTE — TELEPHONE ENCOUNTER
Received refill request for Amlodipine from [facility].     Last ov:2021 NPAL    Last EK2021     Last Refill:2021 #90 tabs w/ 0 refill    Next appointment:2022 ESPERANZA

## 2022-03-28 NOTE — PROGRESS NOTES
[address]   Electrophysiology Follow up   Date: 3/29/2022  I had the privilege of visiting Stone Frey in the office. CC:  Follow up   HPI: Stone Frey is a 78 y.o. male with a past medical history of HTN, HLD, CAD s/p CABG x2, Factor V Leiden, endocarditis s/p MV repair, CVA 2007,  VT s/p AICD 8/27/2015 and atrial flutter. Placed on Xarelto, however he declined AC, risk of thromboembolism has been discussed. Ischemic CMP with EF 45-50%.       He had frequent ventricular tachycardia requiring ATP 35 episodes back in 2018 and he is on amiodarone therapy. This was stopped in 2021 due to abnormal TSH. No longer on Amiodarone     Katty Kenney presents to the office in follow up. He is doing well from a cardiac standpoint. He has no complaints at this time. Patient denies lightheadedness, dizziness, chest pain, palpitations, orthopnea, edema, presyncope or syncope. LE absent from swelling. He was able to walk in with just a cane. Continues to have weakness on his left side. Asked about electrical stimulation therapy since he has a AICD, would not recommend this. Assessment and plan:     -Ventricular Tachycardia   S/p dual chamber ICD 8/27/2015   The CIED was interrogated and programmed and I supervised and reviewed all the data. All findings and changes are in device interrogation sheet and reflect my personal interpretation and changes and is scanned to Epic. 2.8 years remaining, AP 88.4% ,  <0.1%   6 NSVT episodes noted, last on 3/9/2022, longest 3 seconds     No sustained VT. Continue Toprol  mg daily     - Chronotropic incompetency:    Patient heart rates does not increase with activity.     Programming of the device and added rate response.     -Paroxysmal Atrial Flutter   ECG today shows SR, Apaced    0% AT/AF burden per device interrogation   Patient has a [id number] Score of at least 5 (Age, HTN, CAD, CVA2), Not on [facility], patient declines anticoagulation and has declined use of this in the past   Continue Toprol  mg daily for rate control     -CAD   S/p CABGx2 1993, redo 2008. Followed by    On BB, Statin and ASA    -HTN  Not well controlled: 164/90, patient states it usually runs high, discussed need for better BP control especially with his stroke history. Will monitor at home, log and follow up with his PCP  Increase Amlodipine to 5 mg daily  BP goal <130/80  Home BP monitoring encouraged, printed information provided on how to accurately measure BP at home. Counseled to follow a low salt diet to assure blood pressure remains controlled for cardiovascular risk factor modification. The patient is counseled to get regular exercise 3-5 times per week and maintain a healthy weight reduce cardiovascular risk factors.       -CVA/Factor V leiden/DVT and PE 2007   Hx of in 2007   Declined AC, aware of risks     Patient Active Problem List    Diagnosis Date Noted    Muscle spasticity 11/20/2019    Left hemiparesis ([address] 11/20/2019    PAF (paroxysmal atrial fibrillation) ([address] 06/12/2018    Atrial flutter ([address] 01/03/2017    Hx of mitral valve repair 01/03/2017    Coronary artery disease involving coronary bypass graft of native heart without angina pectoris     AICD present, double chamber 08/27/2015    Ischemic cardiomyopathy     Ventricular tachycardia ([address] 08/25/2015    Gastroenteritis 08/21/2015    HLD (hyperlipidemia) 09/21/2011    Coronary Artery Disease 09/21/2011    Essential hypertension, benign 09/21/2011    Mitral valve disorder 09/21/2011    Old MI (myocardial infarction) 09/21/2011    Endocarditis 09/21/2011    CVA (cerebral vascular accident) ([address] 09/21/2011    DVT (deep vein thrombosis), lower extremity, distal (calf) (peroneal)(tibial) (lower leg NOS) 09/21/2011    Spastic hemiplegia affecting nondominant side ([address] 09/09/2008    Muscle weakness (generalized) 04/02/2008    Cognitive deficits as late effect of cerebrovascular disease 04/02/2008     Diagnostic studies:   ECG 3/29/22  Sinus Rhythm    Echo 6/8/2021   Summary   -Left ventricular cavity size is mildly dilated. Normal left ventricular   wall thickness.   -Overall left ventricular systolic function appears mildly reduced with an   ejection fraction in the 45-50% range.   -There is hypokinesis of the inferior walls.   -Indeterminate diastolic function due to mitral ring. E/e\"=19.65.   -An annuloplasty ring is seen in the mitral position.   -Mild mitral regurgitation.   -Aortic valve appears sclerotic but opens adequately. -Trivial tricuspid regurgitation.   -Estimated pulmonary artery systolic pressure is normal at 31 mmHg assuming   a right atrial pressure of 8 mmHg. -The left atrium is mildly dilated. -Pacemaker / ICD lead is visualized in the right heart. Stress 11/9/2017   Summary    -Medium sized inferolateral fixed defect of severe intensity consistent with    infarction in the territory of the proximal to distal LCx and/or RCA .  -There is no evidence of stress induced ischemia.    -LV function is moderately reduced with inferior lateral akinesis and    ejection fraction of 41%.        Recommendation    Discussed with pt and wife.   Meenakshi Anderson will f/u soon with Dr. Adore Boateng for questions surrounding antiarrhythmic    therapy     I independently reviewed the cardiac diagnostic studies, ECG and relevant imaging studies. Lab Results   Component Value Date    LVEF 48 06/07/2021     Lab Results   Component Value Date    TSHFT4 9.00 (H) 09/16/2021    TSH 2.77 10/25/2019         Physical Examination:  Vitals:    03/29/22 1356   BP: (!) 164/90   Pulse: 61   SpO2: 97%      Wt Readings from Last 3 Encounters:   03/29/22 203 lb 9.6 oz (92.4 kg)   11/22/21 197 lb (89.4 kg)   07/01/21 182 lb 12.8 oz (82.9 kg)       · Constitutional: Oriented. No distress. · Head: Normocephalic and atraumatic.    · Mouth/Throat: Oropharynx is clear and moist.   · Eyes: Conjunctivae normal. EOM are normal.   · Neck: Neck supple. No JVD present. · Cardiovascular: Normal rate, regular rhythm, S1&S2. · Pulmonary/Chest: Bilateral respiratory sounds. No rhonchi. · Abdominal: Soft. No tenderness. · Musculoskeletal: No tenderness. No edema    · Lymphadenopathy: Has no cervical adenopathy. · Neurological: Alert and oriented. Follows command, No Gross deficit   · Skin: Skin is warm, No rash noted. · Psychiatric: Has a normal behavior     Review of System:  [x] Full ROS obtained and negative except as mentioned in HPI    Prior to Admission medications    Medication Sig Start Date End Date Taking? Authorizing Provider   amLODIPine (NORVASC) 5 MG tablet Take 1 tablet by mouth daily 3/29/22  Yes Blanca Yanez MD   atorvastatin (LIPITOR) 20 MG tablet TAKE 1 TABLET BY MOUTH EVERY DAY 3/11/22  Yes Kerrie Murray MD   metoprolol succinate (TOPROL XL) 100 MG extended release tablet TAKE 1 AND 1/2 TABLETS BY MOUTH ONE TIME A DAY 1/17/22  Yes ALEX Carpenter CNP   sildenafil (VIAGRA) 100 MG tablet Take 1 tablet by mouth daily as needed for Erectile Dysfunction 6/7/21  Yes Kerrie Murray MD   aspirin 81 MG EC tablet Take 81 mg by mouth daily    Yes Historical Provider, MD       No Known Allergies    Social History:  Reviewed. reports that he quit smoking about 6 years ago. He quit after 40.00 years of use. He has never used smokeless tobacco. He reports current alcohol use. He reports current drug use. Drug: Marijuana Rondi Baba). Family History:  Reviewed. Reviewed. No family history of SCD. Relevant and available labs, and cardiovascular diagnostics reviewed. Reviewed. I independently reviewed relevant and available cardiac diagnostic tests ECG, CXR, Echo, Stress test, Device interrogation, Holter, CT scan. All questions and concerns were addressed to the patient/family. Alternatives to my treatment were discussed.  I have discussed the above stated plan and the patient verbalized understanding and agreed with the plan. Scribe attestation: This note was scribed in the presence of Charly Cosme MD by Ryan Gaytan RN    Physician Attestation: I, Dr. Jesusita Do, confirm that the scribe's documentation has been prepared under my direction and personally reviewed by me in its entirety. I also confirm that the note above accurately reflects all work, treatment, procedures, and medical decision making performed by me. NOTE: This report was transcribed using voice recognition software. Every effort was made to ensure accuracy, however, inadvertent computerized transcription errors may be present.      Jesusita Do MD, MPH  [facility]   Office: [phone]  Fax: [phone]

## 2022-03-29 ENCOUNTER — NURSE ONLY (OUTPATIENT)
Dept: CARDIOLOGY CLINIC | Age: 67
End: 2022-03-29
Payer: MEDICARE

## 2022-03-29 ENCOUNTER — OFFICE VISIT (OUTPATIENT)
Dept: CARDIOLOGY CLINIC | Age: 67
End: 2022-03-29
Payer: MEDICARE

## 2022-03-29 VITALS
WEIGHT: 203.6 LBS | DIASTOLIC BLOOD PRESSURE: 90 MMHG | OXYGEN SATURATION: 97 % | HEIGHT: 71 IN | HEART RATE: 61 BPM | SYSTOLIC BLOOD PRESSURE: 164 MMHG | BODY MASS INDEX: 28.5 KG/M2

## 2022-03-29 DIAGNOSIS — I48.0 PAF (PAROXYSMAL ATRIAL FIBRILLATION) (HCC): ICD-10-CM

## 2022-03-29 DIAGNOSIS — I63.9 CEREBROVASCULAR ACCIDENT (CVA), UNSPECIFIED MECHANISM (HCC): ICD-10-CM

## 2022-03-29 DIAGNOSIS — I47.20 VENTRICULAR TACHYCARDIA: ICD-10-CM

## 2022-03-29 DIAGNOSIS — I48.3 TYPICAL ATRIAL FLUTTER (HCC): ICD-10-CM

## 2022-03-29 DIAGNOSIS — I10 ESSENTIAL HYPERTENSION, BENIGN: Chronic | ICD-10-CM

## 2022-03-29 DIAGNOSIS — I82.4Z9 DEEP VEIN THROMBOSIS (DVT) OF DISTAL VEIN OF LOWER EXTREMITY, UNSPECIFIED CHRONICITY, UNSPECIFIED LATERALITY (HCC): ICD-10-CM

## 2022-03-29 DIAGNOSIS — Z95.810 AICD PRESENT, DOUBLE CHAMBER: ICD-10-CM

## 2022-03-29 DIAGNOSIS — I25.5 ISCHEMIC CARDIOMYOPATHY: ICD-10-CM

## 2022-03-29 DIAGNOSIS — I48.0 PAF (PAROXYSMAL ATRIAL FIBRILLATION) (HCC): Primary | ICD-10-CM

## 2022-03-29 DIAGNOSIS — I25.810 CORONARY ARTERY DISEASE INVOLVING CORONARY BYPASS GRAFT OF NATIVE HEART WITHOUT ANGINA PECTORIS: ICD-10-CM

## 2022-03-29 PROCEDURE — 93290 INTERROG DEV EVAL ICPMS IP: CPT | Performed by: INTERNAL MEDICINE

## 2022-03-29 PROCEDURE — 99214 OFFICE O/P EST MOD 30 MIN: CPT | Performed by: INTERNAL MEDICINE

## 2022-03-29 PROCEDURE — 93000 ELECTROCARDIOGRAM COMPLETE: CPT | Performed by: INTERNAL MEDICINE

## 2022-03-29 PROCEDURE — 93283 PRGRMG EVAL IMPLANTABLE DFB: CPT | Performed by: INTERNAL MEDICINE

## 2022-03-29 RX ORDER — AMLODIPINE BESYLATE 5 MG/1
5 TABLET ORAL DAILY
Qty: 90 TABLET | Refills: 0 | Status: SHIPPED | OUTPATIENT
Start: 2022-03-29 | End: 2022-06-22

## 2022-03-29 NOTE — PROGRESS NOTES
Patient comes in for programming evaluation for her defibrillator. All sensing and pacing parameters are within normal range. Battery life 2.8 years  AP 88.4%.  <0.1%. 6 NSVT episodes noted. Last 3/9/2022, longest 3 seconds. 1 SVT wavelet episode noted on 3/9/2022 lasting 7 seconds. PVC Runs (2-4 beats) 4.9 per hour  PVC Singles  117.1 per hour  Patient remains on metoprolol. Today we turned on RR. Please see interrogation for more detail. Optivol is borderline and on the incline. Patient will see Dr. Garrick Peñaloza today and follow up in 3 months in office or remotely.

## 2022-04-19 RX ORDER — METOPROLOL SUCCINATE 100 MG/1
TABLET, EXTENDED RELEASE ORAL
Qty: 135 TABLET | Refills: 1 | Status: SHIPPED | OUTPATIENT
Start: 2022-04-19 | End: 2022-05-11

## 2022-04-19 NOTE — TELEPHONE ENCOUNTER
Received refill request for Metoprolol from [facility].      Last OV: 03/29/2022 w/ RMM     Last Refill: 01/17/2022 #135 w/ 0 refills     Next Appointment:on recall list for appt on  03/29/2023 w/ RMM

## 2022-05-03 NOTE — PROGRESS NOTES
[address]   Cardiac Follow Up     Referring Provider:  Eliel Fragoso MD     Chief Complaint   Patient presents with    Coronary Artery Disease     No complaints     Hypertension    6 Month Follow-Up        History of Present Illness:  Sonja Redman is a 78 y.o. 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 He is well aware of risk of stroke). 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 His next device check is 5/16/22. His open heart surgery x2 was at [facility].     Today, he is here for a 6 mos follow up with an echo. He states he goes to physical therapy twice a week. He states ability to walk around has remained stable. In general , he does some light yard work that he tolerates well. Pt denies exertional chest pain, ELLISON/PND, palpitations, light-headedness, edema. He plans to go to [place] next week with his family. Pt is with his wife and walking with a cane. Per wife, he is applying for disability. BPs at home mostly 130s-140 and highest 150. Past Medical History:   has a past medical history of CAD (coronary artery disease), CVA (cerebrovascular accident) ([address] Hyperlipidemia, Hypertension, MI (myocardial infarction) ([address] and VT (ventricular tachycardia) ([address] Surgical History:   has a past surgical history that includes Diagnostic Cardiac Cath Lab Procedure; Cardiac surgery; and Cardiac defibrillator placement (8/27/15). Social History:   reports that he quit smoking about 6 years ago. He quit after 40.00 years of use. He has never used smokeless tobacco. He reports current alcohol use.  He reports current drug use. Drug: Marijuana Dave Cabral). Family History:  family history includes Heart Disease in his father and mother. Home Medications:  Prior to Admission medications    Medication Sig Start Date End Date Taking? Authorizing Provider   metoprolol succinate (TOPROL XL) 100 MG extended release tablet Take a total of 150 mg toprol XL daily - a 100 mg tablet and a 50 mg tablet 5/11/22  Yes Bree Smalls MD   metoprolol succinate (TOPROL XL) 50 MG extended release tablet Take a total of 150 mg toprol daily - a 100 mg tablet and a 50 mg tablet 5/11/22  Yes Bree Smalls MD   amLODIPine (NORVASC) 5 MG tablet Take 1 tablet by mouth daily 3/29/22  Yes Bhavana Hurtado MD   atorvastatin (LIPITOR) 20 MG tablet TAKE 1 TABLET BY MOUTH EVERY DAY 3/11/22  Yes Bree Smalls MD   sildenafil (VIAGRA) 100 MG tablet Take 1 tablet by mouth daily as needed for Erectile Dysfunction 6/7/21  Yes Bree Smalls MD   aspirin 81 MG EC tablet Take 81 mg by mouth daily    Yes Historical Provider, MD        Allergies:  Patient has no known allergies. Review of Systems:   · Constitutional: there has been no unanticipated weight loss. There's been no change in energy level, sleep pattern, or activity level. · Eyes: No visual changes or diplopia. No scleral icterus. · ENT: No Headaches, hearing loss or vertigo. No mouth sores or sore throat. · Cardiovascular: Reviewed in HPI  · Respiratory: No cough or wheezing, no sputum production. No hematemesis. · Gastrointestinal: No abdominal pain, appetite loss, blood in stools. No change in bowel or bladder habits. · Genitourinary: No dysuria, trouble voiding, or hematuria. · Musculoskeletal:  No gait disturbance, weakness or joint complaints. · Integumentary: No rash or pruritis. · Neurological: No headache, diplopia, change in muscle strength, numbness or tingling. No change in gait, balance, coordination, mood, affect, memory, mentation, behavior.   · Psychiatric: No anxiety, no depression. · Endocrine: No malaise, fatigue or temperature intolerance. No excessive thirst, fluid intake, or urination. No tremor. · Hematologic/Lymphatic: No abnormal bruising or bleeding, blood clots or swollen lymph nodes. · Allergic/Immunologic: No nasal congestion or hives. Physical Examination:    Vitals:    05/11/22 1111   BP: 136/88   Pulse: 66   SpO2: 99%        Wt Readings from Last 1 Encounters:   05/11/22 202 lb 6.4 oz (91.8 kg)       Constitutional and General Appearance: NAD   Skin:good turgor,intact without lesions  HEENT: EOMI ,normal  Neck:no JVD    Respiratory:  · Normal excursion and expansion without use of accessory muscles  · Resp Auscultation: Normal breath sounds without dullness  Cardiovascular:  · The apical impulses not displaced  · Heart tones are crisp and normal  · Cervical veins are not engorged  · The carotid upstroke is normal in amplitude and contour without delay or bruit  · 2/6 Holosystolic Murmur   · Peripheral pulses are symmetrical and full  · There is no clubbing, cyanosis of the extremities. · No edema  · Femoral Arteries: 2+ and equal  · Pedal Pulses: 2+ and equal   Abdomen:  · No masses or tenderness  · Liver/Spleen: No Abnormalities Noted  Neurological/Psychiatric:  · Alert and oriented in all spheres  · Moves all extremities well  · Left arm paralyzed,limping gait  · No abnormalities of mood, affect, memory, mentation, or behavior are noted      Assessment:    1. Coronary artery disease involving coronary bypass graft of native heart without angina pectoris   ~stable : offers no c/o angina  ~s/p CABG '93, redo '08  ~fixed defect on myoview '17       2. Mitral valve disorder   ~hx of endocarditis   ~s/p mitral valve repair  ~stable on echo 6/21   ~ echo today > stable  Echo yearly        3. Essential hypertension, benign   ~controlled   Blood pressure 136/88, pulse 66, height 5' 11\" (1.803 m), weight 202 lb 6.4 oz (91.8 kg), SpO2 99 %.   on amlodipine and toprol Recheck by me 134/66        4. Typical atrial flutter (HCC)   ~controlled AP       5. Mixed hyperlipidemia              ~controlled               ~ continue atorvastatin    9/16/21  TG 54 HDL 80 LDL 83      Plan:   Cardiac test and lab results personally reviewed by me during this office visit and discussed. Lipids , CMP, cbc prior to next office visit  Yearly echo  Continue risk factor modifications. Call for any change in symptoms. Return for regular follow up in 6 mos          I appreciate the opportunity of cooperating in the care of this individual.    Isela Ricketts M.D., [facility]    Patient's problem list, medications, allergies, past medical, surgical, social and family histories were reviewed and updated as appropriate. Scribe's attestation: This note was scribed in the presence of Dr Joanne Ricketts by Naren Fu RN. The scribe's documentation has been prepared under my direction and personally reviewed by me in its entirety. I confirm that the note above accurately reflects all work, treatment, procedures, and medical decision making performed by me.

## 2022-05-11 ENCOUNTER — OFFICE VISIT (OUTPATIENT)
Dept: CARDIOLOGY CLINIC | Age: 67
End: 2022-05-11

## 2022-05-11 ENCOUNTER — PROCEDURE VISIT (OUTPATIENT)
Dept: CARDIOLOGY CLINIC | Age: 67
End: 2022-05-11
Payer: MEDICARE

## 2022-05-11 VITALS
SYSTOLIC BLOOD PRESSURE: 136 MMHG | BODY MASS INDEX: 28.34 KG/M2 | HEART RATE: 66 BPM | HEIGHT: 71 IN | WEIGHT: 202.4 LBS | DIASTOLIC BLOOD PRESSURE: 88 MMHG | OXYGEN SATURATION: 99 %

## 2022-05-11 DIAGNOSIS — I48.3 TYPICAL ATRIAL FLUTTER (HCC): ICD-10-CM

## 2022-05-11 DIAGNOSIS — I05.9 MITRAL VALVE DISORDER: Chronic | ICD-10-CM

## 2022-05-11 DIAGNOSIS — I10 ESSENTIAL HYPERTENSION, BENIGN: Chronic | ICD-10-CM

## 2022-05-11 DIAGNOSIS — E78.2 MIXED HYPERLIPIDEMIA: Chronic | ICD-10-CM

## 2022-05-11 DIAGNOSIS — I05.9 MITRAL VALVE DISORDER: ICD-10-CM

## 2022-05-11 DIAGNOSIS — I25.810 CORONARY ARTERY DISEASE INVOLVING CORONARY BYPASS GRAFT OF NATIVE HEART WITHOUT ANGINA PECTORIS: Primary | ICD-10-CM

## 2022-05-11 DIAGNOSIS — R06.02 SOB (SHORTNESS OF BREATH): ICD-10-CM

## 2022-05-11 DIAGNOSIS — Z98.890 HX OF MITRAL VALVE REPAIR: ICD-10-CM

## 2022-05-11 LAB
LV EF: 43 %
LVEF MODALITY: NORMAL

## 2022-05-11 PROCEDURE — 93356 MYOCRD STRAIN IMG SPCKL TRCK: CPT | Performed by: INTERNAL MEDICINE

## 2022-05-11 PROCEDURE — 99214 OFFICE O/P EST MOD 30 MIN: CPT | Performed by: INTERNAL MEDICINE

## 2022-05-11 PROCEDURE — 93306 TTE W/DOPPLER COMPLETE: CPT | Performed by: INTERNAL MEDICINE

## 2022-05-11 RX ORDER — METOPROLOL SUCCINATE 50 MG/1
TABLET, EXTENDED RELEASE ORAL
Qty: 30 TABLET | Refills: 3 | Status: SHIPPED | OUTPATIENT
Start: 2022-05-11 | End: 2022-09-28

## 2022-05-11 RX ORDER — METOPROLOL SUCCINATE 100 MG/1
TABLET, EXTENDED RELEASE ORAL
Qty: 90 TABLET | Refills: 3 | Status: SHIPPED | OUTPATIENT
Start: 2022-05-11

## 2022-05-11 NOTE — PATIENT INSTRUCTIONS
labs prior to next office visit  Yearly echo  Continue risk factor modifications. Call for any change in symptoms.   Return for regular follow up in 6 mos
[facility]...

## 2022-05-16 ENCOUNTER — NURSE ONLY (OUTPATIENT)
Dept: CARDIOLOGY CLINIC | Age: 67
End: 2022-05-16
Payer: MEDICARE

## 2022-05-16 DIAGNOSIS — I25.5 ISCHEMIC CARDIOMYOPATHY: ICD-10-CM

## 2022-05-16 DIAGNOSIS — Z95.810 CARDIAC DEFIBRILLATOR IN PLACE: ICD-10-CM

## 2022-05-16 NOTE — PROGRESS NOTES
We received remote transmission from patient's monitor at home. Transmission shows patients ICD has normal sensing and pacing function. Noted NSVT. EP physician will review. See interrogation under cardiology tab in the [address] [place] [address] [address] field for more details. Optivol is within normal range.

## 2022-05-17 PROCEDURE — 93295 DEV INTERROG REMOTE 1/2/MLT: CPT | Performed by: INTERNAL MEDICINE

## 2022-05-17 PROCEDURE — 93296 REM INTERROG EVL PM/IDS: CPT | Performed by: INTERNAL MEDICINE

## 2022-06-22 DIAGNOSIS — I25.810 CORONARY ARTERY DISEASE INVOLVING CORONARY BYPASS GRAFT OF NATIVE HEART WITHOUT ANGINA PECTORIS: ICD-10-CM

## 2022-06-22 RX ORDER — AMLODIPINE BESYLATE 5 MG/1
TABLET ORAL
Qty: 90 TABLET | Refills: 0 | Status: SHIPPED | OUTPATIENT
Start: 2022-06-22 | End: 2022-09-26 | Stop reason: SDUPTHER

## 2022-06-22 NOTE — TELEPHONE ENCOUNTER
Received refill request for Amlodipine from [facility].     Last ov: 2022 LES    Last EK2022    Last Refill: 2022 #90 w/ 0 refills    Next appointment: 2022 LES

## 2022-06-27 RX ORDER — ATORVASTATIN CALCIUM 20 MG/1
TABLET, FILM COATED ORAL
Qty: 90 TABLET | Refills: 0 | Status: SHIPPED | OUTPATIENT
Start: 2022-06-27 | End: 2022-09-27

## 2022-08-15 ENCOUNTER — NURSE ONLY (OUTPATIENT)
Dept: CARDIOLOGY CLINIC | Age: 67
End: 2022-08-15
Payer: MEDICARE

## 2022-08-15 DIAGNOSIS — Z95.810 CARDIAC DEFIBRILLATOR IN PLACE: ICD-10-CM

## 2022-08-15 DIAGNOSIS — I25.5 ISCHEMIC CARDIOMYOPATHY: Primary | ICD-10-CM

## 2022-08-15 PROCEDURE — 93295 DEV INTERROG REMOTE 1/2/MLT: CPT | Performed by: INTERNAL MEDICINE

## 2022-08-15 PROCEDURE — 93296 REM INTERROG EVL PM/IDS: CPT | Performed by: INTERNAL MEDICINE

## 2022-08-15 NOTE — PROGRESS NOTES
We received remote transmission from patient's monitor at home. Transmission shows patients ICD has normal sensing and pacing function. Noted AT, ST and AF. Pt is on Toprol. Pt declines anti coags. EP physician will review. See interrogation under cardiology tab in the [address] [place] [address] [address] field for more details. Total  < 0.1% (MVP On)  AS-VS 23.0%  AS- < 0.1%  AP-VS 76.9%  AP- < 0.1%    Optivol is within normal range. End of 91-day monitoring period 8-15-22.

## 2022-09-22 DIAGNOSIS — I25.810 CORONARY ARTERY DISEASE INVOLVING CORONARY BYPASS GRAFT OF NATIVE HEART WITHOUT ANGINA PECTORIS: ICD-10-CM

## 2022-09-26 RX ORDER — AMLODIPINE BESYLATE 5 MG/1
TABLET ORAL
Qty: 90 TABLET | Refills: 1 | Status: SHIPPED | OUTPATIENT
Start: 2022-09-26

## 2022-09-27 RX ORDER — ATORVASTATIN CALCIUM 20 MG/1
TABLET, FILM COATED ORAL
Qty: 90 TABLET | Refills: 0 | Status: SHIPPED | OUTPATIENT
Start: 2022-09-27

## 2022-09-28 RX ORDER — METOPROLOL SUCCINATE 50 MG/1
TABLET, EXTENDED RELEASE ORAL
Qty: 30 TABLET | Refills: 0 | Status: SHIPPED | OUTPATIENT
Start: 2022-09-28

## 2022-10-12 NOTE — PROGRESS NOTES
DIAGNOSIS:  Left ankle lateral malleolus displaced fracture, status post ORIF. DATE OF SURGERY: 8/18/2022. HISTORY OF PRESENT ILLNESS:  Mr. [address] 21 y.o.  male who came in today for 8 weeks postoperative visit. The patient denies any significant pain in the left ankle. Rates pain a 1/10 VAS mild, aching, intermittent and are improving. Aggravating factors walking movement. Alleviating factors elevation and rest. He has been in a boot, and WB and discontinued his boot this week. No numbness or tingling sensation. No fever or Chills. He is a smoker. He works in construction. PHYSICAL EXAMINATION:  The incision healing well. No signs of any erythema or drainage, minimal swelling. He has no pain with the active or passive range of motion of the left ankle, but minimally decrease ROM. He has intact sensation distally, and he is neurovascularly intact. IMAGING:  Three views left ankle taken today in the office showed anatomic alignment of the fracture, plate and screws in good position, no loosening. Ankle mortise is well centered. IMPRESSION:  8 weeks out from left ankle lateral malleolus fracture, ORIF and doing very well. PLAN:  He will be WBAT in the boot, and start aggressive ROM and peroneal strengthening exercise. Off the boot in this week. No heavy impact activities. The patient will come back for a follow up in 6 weeks. At that time, we will take 3 views of the left ankle standing. The patient smokes cigarettes, and we discussed with the patient the risks of smoking on general health and also on bone and soft tissue healing (delay and non-union), and promised to cut down or stop smoking. Smoking: Educated the patient regarding the hazards of smoking and that it harms their body in many ways. It increases the chance of developing heart disease, lung disease, cancer, and other health problems including poor bone and wound healing.     The importance of smoking cessation We received remote transmission from patient's monitor at home. Transmission shows normal sensing and pacing function. EP physician will review. See interrogation under cardiology tab in the [address] [facility] [address] field for more details. Optivol is within normal range. for optimal bone and wound healing was stressed. This was communicated verbally, 5 Minutes.       GABRIEL Faulkner - CNP

## 2022-11-10 NOTE — PROGRESS NOTES
[facility]   Cardiac Follow Up     Referring Provider:  Marcia Sauer MD     Chief Complaint   Patient presents with    Coronary Artery Disease     No complaints     6 Month Follow-Up          History of Present Illness:  Romel Metzger is a 78 y.o. 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 He is well aware of risk of stroke). 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 His next device check is 5/16/22. His open heart surgery x2 was at [facility].     Today, he is here for a 6 mos follow up. He is with his wife and walks with a cane. He's been feeling \"great. \" Pt denies exertional chest pain, ELLISON/PND, palpitations, light-headedness, edema. Past Medical History:   has a past medical history of CAD (coronary artery disease), CVA (cerebrovascular accident) ([address] Hyperlipidemia, Hypertension, MI (myocardial infarction) ([address] and VT (ventricular tachycardia). Surgical History:   has a past surgical history that includes Diagnostic Cardiac Cath Lab Procedure; Cardiac surgery; and Cardiac defibrillator placement (8/27/15). Social History:   reports that he quit smoking about 7 years ago. He has never used smokeless tobacco. He reports current alcohol use. He reports current drug use. Drug: Marijuana Robyn Jame). Family History:  family history includes Heart Disease in his father and mother. Home Medications:  Prior to Admission medications    Medication Sig Start Date End Date Taking?  Authorizing Provider   metoprolol succinate (TOPROL XL) 50 MG extended release tablet TAKE ONE 50 MG TABLET BY MOUTH DAILY IN ADDITION TO TO  MG TABLET FOR A TOTAL DAILY QUANTITY  MG 9/28/22  Yes Logan Fountain MD   atorvastatin (LIPITOR) 20 MG tablet TAKE 1 TABLET BY MOUTH EVERY DAY 9/27/22  Yes Logan Fountain MD   amLODIPine (NORVASC) 5 MG tablet TAKE 1 TABLET BY MOUTH EVERY DAY 9/26/22  Yes A[facility]   metoprolol succinate (TOPROL XL) 100 MG extended release tablet Take a total of 150 mg toprol XL daily - a 100 mg tablet and a 50 mg tablet 5/11/22  Yes Logan Fountain MD   sildenafil (VIAGRA) 100 MG tablet Take 1 tablet by mouth daily as needed for Erectile Dysfunction 6/7/21  Yes Logan Fountain MD   aspirin 81 MG EC tablet Take 81 mg by mouth daily    Yes Historical Provider, MD        Allergies:  Patient has no known allergies. Review of Systems:   Constitutional: there has been no unanticipated weight loss. There's been no change in energy level, sleep pattern, or activity level. Eyes: No visual changes or diplopia. No scleral icterus. ENT: No Headaches, hearing loss or vertigo. No mouth sores or sore throat. Cardiovascular: Reviewed in HPI  Respiratory: No cough or wheezing, no sputum production. No hematemesis. Gastrointestinal: No abdominal pain, appetite loss, blood in stools. No change in bowel or bladder habits. Genitourinary: No dysuria, trouble voiding, or hematuria. Musculoskeletal:  No gait disturbance, weakness or joint complaints. Integumentary: No rash or pruritis. Neurological: No headache, diplopia, change in muscle strength, numbness or tingling. No change in gait, balance, coordination, mood, affect, memory, mentation, behavior. Psychiatric: No anxiety, no depression. Endocrine: No malaise, fatigue or temperature intolerance. No excessive thirst, fluid intake, or urination. No tremor. Hematologic/Lymphatic: No abnormal bruising or bleeding, blood clots or swollen lymph nodes. Allergic/Immunologic: No nasal congestion or hives.     Physical Examination:    Vitals: 11/23/22 1129   BP: 138/88   Pulse: 74   SpO2: 98%          Wt Readings from Last 1 Encounters:   11/23/22 212 lb 12.8 oz (96.5 kg)       Constitutional and General Appearance: NAD   Skin:good turgor,intact without lesions  HEENT: EOMI ,normal  Neck:no JVD    Respiratory:  Normal excursion and expansion without use of accessory muscles  Resp Auscultation: Normal breath sounds without dullness  Cardiovascular: The apical impulses not displaced  Heart tones are crisp and normal  Cervical veins are not engorged  The carotid upstroke is normal in amplitude and contour without delay or bruit  2/6 Holosystolic Murmur   Peripheral pulses are symmetrical and full  There is no clubbing, cyanosis of the extremities. No edema  Femoral Arteries: 2+ and equal  Pedal Pulses: 2+ and equal   Abdomen:  No masses or tenderness  Liver/Spleen: No Abnormalities Noted  Neurological/Psychiatric:  Alert and oriented in all spheres  Moves all extremities well  Left arm paralyzed,limping gait  No abnormalities of mood, affect, memory, mentation, or behavior are noted      Assessment:    1. Coronary artery disease involving coronary bypass graft of native heart without angina pectoris   ~stable   ~denies anginal symptoms   ~s/p CABG '93, redo '08  ~fixed defect on myoview '17       2. Mitral valve disorder   ~hx of endocarditis   ~s/p mitral valve repair  ~stable on echo 6/21   ~ echo 5/11/22 > stable  Echo yearly        3. Essential hypertension, benign   ~controlled   Blood pressure 138/88, pulse 74, height 5' 11\" (1.803 m), weight 212 lb 12.8 oz (96.5 kg), SpO2 98 %. Continue on amlodipine and toprol        4. Typical atrial flutter (HCC)   ~controlled AP       5. Mixed hyperlipidemia              ~controlled               ~ continue atorvastatin 20 mg   9/16/21  TG 54 HDL 80 LDL 83      Plan:   Cardiac test and lab results personally reviewed by me during this office visit and discussed.      Continue risk factor modifications. Call for any change in symptoms, call to report any changes in shortness of breath or development of chest pain with activity. Follow up in 6 mos        I appreciate the opportunity of cooperating in the care of this individual.    Chepe Leal. Tabitha Ta M.D., [facility] [place]    Patient's problem list, medications, allergies, past medical, surgical, social and family histories were reviewed and updated as appropriate. Scribe's attestation: This note was scribed in the presence of Dr Tabitha Ta by Dinorah Swanson RN. The scribe's documentation has been prepared under my direction and personally reviewed by me in its entirety. I confirm that the note above accurately reflects all work, treatment, procedures, and medical decision making performed by me.

## 2022-11-14 ENCOUNTER — NURSE ONLY (OUTPATIENT)
Dept: CARDIOLOGY CLINIC | Age: 67
End: 2022-11-14
Payer: MEDICARE

## 2022-11-14 DIAGNOSIS — Z95.810 CARDIAC DEFIBRILLATOR IN PLACE: ICD-10-CM

## 2022-11-14 DIAGNOSIS — I47.20 VENTRICULAR TACHYCARDIA: Primary | ICD-10-CM

## 2022-11-14 PROCEDURE — 93296 REM INTERROG EVL PM/IDS: CPT | Performed by: INTERNAL MEDICINE

## 2022-11-14 PROCEDURE — 93295 DEV INTERROG REMOTE 1/2/MLT: CPT | Performed by: INTERNAL MEDICINE

## 2022-11-14 NOTE — PROGRESS NOTES
We received remote transmission from patient's monitor at home. Transmission shows patients ICD has normal sensing and pacing function. Noted AT/AF, ST and NSVT. Pt is on Toprol. Pt declines anti coags. EP physician will review. See interrogation under cardiology tab in the [address] [place] [address] [address] field for more details. Total  0.1% (MVP On)  AS-VS 16.8%  AS- < 0.1%  AP-VS 83.1%  AP- < 0.1%    Optivol is within normal range. End of 91-day monitoring period 11-14-22.

## 2022-11-22 DIAGNOSIS — E78.2 MIXED HYPERLIPIDEMIA: Chronic | ICD-10-CM

## 2022-11-22 DIAGNOSIS — I05.9 MITRAL VALVE DISORDER: Chronic | ICD-10-CM

## 2022-11-22 DIAGNOSIS — I25.810 CORONARY ARTERY DISEASE INVOLVING CORONARY BYPASS GRAFT OF NATIVE HEART WITHOUT ANGINA PECTORIS: ICD-10-CM

## 2022-11-22 LAB
A/G RATIO: 1.5 (ref 1.1–2.2)
ALBUMIN SERPL-MCNC: 4.4 G/DL (ref 3.4–5)
ALP BLD-CCNC: 121 U/L (ref 40–129)
ALT SERPL-CCNC: 10 U/L (ref 10–40)
ANION GAP SERPL CALCULATED.3IONS-SCNC: 15 MMOL/L (ref 3–16)
AST SERPL-CCNC: 14 U/L (ref 15–37)
BANDED NEUTROPHILS RELATIVE PERCENT: 1 % (ref 0–7)
BASOPHILS ABSOLUTE: 0 K/UL (ref 0–0.2)
BASOPHILS RELATIVE PERCENT: 0 %
BILIRUB SERPL-MCNC: 0.5 MG/DL (ref 0–1)
BUN BLDV-MCNC: 18 MG/DL (ref 7–20)
CALCIUM SERPL-MCNC: 9.4 MG/DL (ref 8.3–10.6)
CHLORIDE BLD-SCNC: 106 MMOL/L (ref 99–110)
CHOLESTEROL, TOTAL: 159 MG/DL (ref 0–199)
CO2: 22 MMOL/L (ref 21–32)
CREAT SERPL-MCNC: 1.6 MG/DL (ref 0.8–1.3)
EOSINOPHILS ABSOLUTE: 0.9 K/UL (ref 0–0.6)
EOSINOPHILS RELATIVE PERCENT: 10 %
GFR SERPL CREATININE-BSD FRML MDRD: 47 ML/MIN/{1.73_M2}
GLUCOSE BLD-MCNC: 96 MG/DL (ref 70–99)
HCT VFR BLD CALC: 44.1 % (ref 40.5–52.5)
HDLC SERPL-MCNC: 57 MG/DL (ref 40–60)
HEMATOLOGY PATH CONSULT: NO
HEMOGLOBIN: 14.7 G/DL (ref 13.5–17.5)
LDL CHOLESTEROL CALCULATED: 88 MG/DL
LYMPHOCYTES ABSOLUTE: 2 K/UL (ref 1–5.1)
LYMPHOCYTES RELATIVE PERCENT: 23 %
MCH RBC QN AUTO: 31.7 PG (ref 26–34)
MCHC RBC AUTO-ENTMCNC: 33.4 G/DL (ref 31–36)
MCV RBC AUTO: 95 FL (ref 80–100)
MONOCYTES ABSOLUTE: 1 K/UL (ref 0–1.3)
MONOCYTES RELATIVE PERCENT: 12 %
NEUTROPHILS ABSOLUTE: 4.7 K/UL (ref 1.7–7.7)
NEUTROPHILS RELATIVE PERCENT: 54 %
PDW BLD-RTO: 14.1 % (ref 12.4–15.4)
PLATELET # BLD: 233 K/UL (ref 135–450)
PMV BLD AUTO: 9.1 FL (ref 5–10.5)
POTASSIUM SERPL-SCNC: 4.7 MMOL/L (ref 3.5–5.1)
RBC # BLD: 4.64 M/UL (ref 4.2–5.9)
RBC # BLD: NORMAL 10*6/UL
SODIUM BLD-SCNC: 143 MMOL/L (ref 136–145)
TOTAL PROTEIN: 7.3 G/DL (ref 6.4–8.2)
TRIGL SERPL-MCNC: 68 MG/DL (ref 0–150)
VLDLC SERPL CALC-MCNC: 14 MG/DL
WBC # BLD: 8.6 K/UL (ref 4–11)

## 2022-11-23 ENCOUNTER — OFFICE VISIT (OUTPATIENT)
Dept: CARDIOLOGY CLINIC | Age: 67
End: 2022-11-23
Payer: MEDICARE

## 2022-11-23 VITALS
OXYGEN SATURATION: 98 % | DIASTOLIC BLOOD PRESSURE: 88 MMHG | HEIGHT: 71 IN | SYSTOLIC BLOOD PRESSURE: 138 MMHG | HEART RATE: 74 BPM | BODY MASS INDEX: 29.79 KG/M2 | WEIGHT: 212.8 LBS

## 2022-11-23 DIAGNOSIS — I10 ESSENTIAL HYPERTENSION, BENIGN: ICD-10-CM

## 2022-11-23 DIAGNOSIS — E78.2 MIXED HYPERLIPIDEMIA: ICD-10-CM

## 2022-11-23 DIAGNOSIS — I48.3 TYPICAL ATRIAL FLUTTER (HCC): ICD-10-CM

## 2022-11-23 DIAGNOSIS — I05.9 MITRAL VALVE DISORDER: ICD-10-CM

## 2022-11-23 DIAGNOSIS — I25.810 CORONARY ARTERY DISEASE INVOLVING CORONARY BYPASS GRAFT OF NATIVE HEART WITHOUT ANGINA PECTORIS: Primary | ICD-10-CM

## 2022-11-23 PROCEDURE — 99214 OFFICE O/P EST MOD 30 MIN: CPT | Performed by: INTERNAL MEDICINE

## 2022-11-23 PROCEDURE — 3078F DIAST BP <80 MM HG: CPT | Performed by: INTERNAL MEDICINE

## 2022-11-23 PROCEDURE — 3074F SYST BP LT 130 MM HG: CPT | Performed by: INTERNAL MEDICINE

## 2022-11-23 PROCEDURE — 1123F ACP DISCUSS/DSCN MKR DOCD: CPT | Performed by: INTERNAL MEDICINE

## 2022-12-12 ENCOUNTER — TELEPHONE (OUTPATIENT)
Dept: CARDIOLOGY CLINIC | Age: 67
End: 2022-12-12

## 2022-12-12 RX ORDER — METOPROLOL SUCCINATE 50 MG/1
TABLET, EXTENDED RELEASE ORAL
Qty: 30 TABLET | Refills: 0 | Status: SHIPPED | OUTPATIENT
Start: 2022-12-12

## 2022-12-12 NOTE — TELEPHONE ENCOUNTER
Medication Refill    Medication needing refilled:  metoprolol succinate (TOPROL XL)     Dosage of the medication: 50mg    How are you taking this medication (QD, BID, TID, QID, PRN):  TAKE ONE 50 MG TABLET BY MOUTH DAILY IN ADDITION TO TO  MG TABLET FOR A TOTAL DAILY QUANTITY  MG  30 or 90 day supply called in: 90    When will you run out of your medication:    Which Pharmacy are we sending the medication to?:    [address] [facility], [address] [facility] [phone] - F [phone]

## 2022-12-27 DIAGNOSIS — I25.810 CORONARY ARTERY DISEASE INVOLVING CORONARY BYPASS GRAFT OF NATIVE HEART WITHOUT ANGINA PECTORIS: ICD-10-CM

## 2022-12-28 RX ORDER — ATORVASTATIN CALCIUM 20 MG/1
TABLET, FILM COATED ORAL
Qty: 90 TABLET | Refills: 3 | Status: SHIPPED | OUTPATIENT
Start: 2022-12-28

## 2023-01-01 NOTE — TELEPHONE ENCOUNTER
DISCHARGE INSTRUCTIONS    Name: Girl [place]  YOB: 2024  Primary Diagnosis:   Patient Active Problem List:     Single liveborn infant delivered vaginally     [place] affected by maternal hypertensive disorder      affected by maternal group B Streptococcus infection, mother treated prophylactically    General:     Cord Care:   Keep dry. Keep diaper folded below umbilical cord. Feeding: Breastfeed baby on demand, every 2-3 hours, (at least 8 times in a 24 hour period). Medications: none      Birthweight: Birth Weight: 3.145 kg (6 lb 14.9 oz)  % Weight change: -4%  Discharge weight: 3.025 kg  Last Bilirubin: 8.1 @ 30 HOL, LL = 13.3      Physical Activity / Restrictions / Safety:        Positioning: Position baby on his or her back while sleeping. Use a firm mattress. No Co Bedding. Car Seat: Car seat should be reclining, rear facing, and in the back seat of the car. Notify Doctor For:     Call your baby's doctor for the following:   Fever over 100.3 degrees, taken Axillary or Rectally  Yellow Skin color  Increased irritability and / or sleepiness  Wetting less than 5 diapers per day for formula fed babies  Wetting less than 6 diapers per day once your breast milk is in, (at 117 days of age)  Diarrhea or Vomiting    Pain Management:     Pain Management: Bundling, Patting, Dress Appropriately    Follow-Up Care:     Appointment with MD: [facility]   Call your baby's doctors office on the next business day to make an appointment for baby's first office visit.          Signed By: Samantha Pham MD                                                                                                   Date: 2023 Time: 12:54 PM Received refill request for amiodarone from [facility].     Last ov:10/21/2020 with NPTS    Last labs:CMP, 10/21/2020    Last EKG:10/21/2020    Last Refill:amiodarone #90 with 0 refills     Next appointment: recall list for April with LES

## 2023-01-03 RX ORDER — METOPROLOL SUCCINATE 50 MG/1
TABLET, EXTENDED RELEASE ORAL
Qty: 30 TABLET | Refills: 0 | Status: SHIPPED | OUTPATIENT
Start: 2023-01-03

## 2023-01-03 NOTE — TELEPHONE ENCOUNTER
Last OV:11/23/2022  Jeramy Graandos  Last Ekg 03/29/2022  Wisam  Next OV: 05/10/2023 Maria Alejandra  Last Refill: 12/12/2022  Jeramy Granados

## 2023-02-15 ENCOUNTER — NURSE ONLY (OUTPATIENT)
Dept: CARDIOLOGY CLINIC | Age: 68
End: 2023-02-15
Payer: MEDICARE

## 2023-02-15 DIAGNOSIS — I25.5 ISCHEMIC CARDIOMYOPATHY: Primary | ICD-10-CM

## 2023-02-15 DIAGNOSIS — Z95.810 CARDIAC DEFIBRILLATOR IN PLACE: ICD-10-CM

## 2023-02-15 PROCEDURE — 93295 DEV INTERROG REMOTE 1/2/MLT: CPT | Performed by: INTERNAL MEDICINE

## 2023-02-15 PROCEDURE — 93296 REM INTERROG EVL PM/IDS: CPT | Performed by: INTERNAL MEDICINE

## 2023-02-15 NOTE — PROGRESS NOTES
We received remote transmission from patient's monitor at home. Transmission shows patients ICD has normal sensing and pacing function. Noted AT/AF and NSVT. Pt is on Toprol. Pt declines anti coags. EP physician will review. See interrogation under cardiology tab in the [address] [place] [address] [address] field for more details. Total  0.1% (MVP On)  AS-VS 20.6%  AS- < 0.1%  AP-VS 79.3%  AP- < 0.1%    Optivol is within normal range. End of 91-day monitoring period 2-15-23.

## 2023-02-17 RX ORDER — METOPROLOL SUCCINATE 50 MG/1
TABLET, EXTENDED RELEASE ORAL
Qty: 30 TABLET | Refills: 0 | Status: SHIPPED | OUTPATIENT
Start: 2023-02-17

## 2023-02-17 RX ORDER — METOPROLOL SUCCINATE 50 MG/1
TABLET, EXTENDED RELEASE ORAL
Qty: 30 TABLET | Refills: 0 | OUTPATIENT
Start: 2023-02-17

## 2023-02-17 NOTE — TELEPHONE ENCOUNTER
Candy Hawthorne, wife called to check on when will the script for metoprolol be sent over to [place] as the Pt is out of his med. Please advise.   Thank you

## 2023-02-17 NOTE — TELEPHONE ENCOUNTER
Received refill request for Metoprolol from [facility].     Last ov:2022 LES    Last EK2022    Last Refill:2023     Next appointment:05/10/2023 LES

## 2023-02-17 NOTE — TELEPHONE ENCOUNTER
Medication Refill    Medication needing refilled:  metoprolol    Dosage of the medication: 50mg    How are you taking this medication (QD, BID, TID, QID, PRN):  TAKE 1 TABLET BY MOUTH EVERY DAY IN ADDITION TO  MG TABLET FOR A TOTAL DAILY DOSE  MG.     30 or 90 day supply called in:  90    When will you run out of your medication:    Which Pharmacy are we sending the medication to?:    [address] [place] [address], [address] [place] [address] [phone]

## 2023-03-27 RX ORDER — METOPROLOL SUCCINATE 50 MG/1
TABLET, EXTENDED RELEASE ORAL
Qty: 30 TABLET | Refills: 0 | Status: SHIPPED | OUTPATIENT
Start: 2023-03-27

## 2023-03-27 RX ORDER — METOPROLOL SUCCINATE 50 MG/1
TABLET, EXTENDED RELEASE ORAL
Qty: 30 TABLET | Refills: 0 | Status: SHIPPED | OUTPATIENT
Start: 2023-03-27 | End: 2023-03-27 | Stop reason: SDUPTHER

## 2023-03-27 NOTE — TELEPHONE ENCOUNTER
Received refill request for Metoprolol from [facility].     Last ov: 11/23/2022 LES    Last Refill: 02/17/2023 #30 w/ 0 refills    Next appointment: 05/10/2023 LES

## 2023-03-27 NOTE — TELEPHONE ENCOUNTER
Medication Refill    Medication needing refilled:  metoprolol succinate     Dosage of the medication:  50mg    How are you taking this medication (QD, BID, TID, QID, PRN):  TAKE 1 TABLET BY MOUTH EVERY DAY IN ADDITION TO  MG TABLET FOR A TOTAL DAILY DOSE  MG.     30 or 90 day supply called in: 90    When will you run out of your medication:    Which Pharmacy are we sending the medication to?:    [address] [place] [address], [address] [facility] 688 [phone] - F [phone] v

## 2023-05-10 ENCOUNTER — OFFICE VISIT (OUTPATIENT)
Dept: CARDIOLOGY CLINIC | Age: 68
End: 2023-05-10

## 2023-05-10 VITALS
BODY MASS INDEX: 30.49 KG/M2 | OXYGEN SATURATION: 97 % | HEIGHT: 71 IN | DIASTOLIC BLOOD PRESSURE: 68 MMHG | HEART RATE: 81 BPM | WEIGHT: 217.8 LBS | SYSTOLIC BLOOD PRESSURE: 138 MMHG

## 2023-05-10 DIAGNOSIS — E78.2 MIXED HYPERLIPIDEMIA: ICD-10-CM

## 2023-05-10 DIAGNOSIS — I05.9 MITRAL VALVE DISORDER: ICD-10-CM

## 2023-05-10 DIAGNOSIS — I48.3 TYPICAL ATRIAL FLUTTER (HCC): ICD-10-CM

## 2023-05-10 DIAGNOSIS — I10 ESSENTIAL HYPERTENSION, BENIGN: ICD-10-CM

## 2023-05-10 DIAGNOSIS — I25.810 CORONARY ARTERY DISEASE INVOLVING CORONARY BYPASS GRAFT OF NATIVE HEART WITHOUT ANGINA PECTORIS: Primary | ICD-10-CM

## 2023-05-10 RX ORDER — SILDENAFIL 100 MG/1
100 TABLET, FILM COATED ORAL DAILY PRN
Qty: 10 TABLET | Refills: 3 | Status: SHIPPED | OUTPATIENT
Start: 2023-05-10

## 2023-05-10 RX ORDER — METOPROLOL SUCCINATE 50 MG/1
TABLET, EXTENDED RELEASE ORAL
Qty: 90 TABLET | Refills: 3 | Status: SHIPPED | OUTPATIENT
Start: 2023-05-10

## 2023-05-10 RX ORDER — ATORVASTATIN CALCIUM 20 MG/1
20 TABLET, FILM COATED ORAL DAILY
Qty: 90 TABLET | Refills: 3 | Status: SHIPPED | OUTPATIENT
Start: 2023-05-10

## 2023-05-10 RX ORDER — AMLODIPINE BESYLATE 5 MG/1
5 TABLET ORAL DAILY
Qty: 90 TABLET | Refills: 3 | Status: SHIPPED | OUTPATIENT
Start: 2023-05-10

## 2023-05-10 RX ORDER — METOPROLOL SUCCINATE 100 MG/1
TABLET, EXTENDED RELEASE ORAL
Qty: 90 TABLET | Refills: 3 | Status: SHIPPED | OUTPATIENT
Start: 2023-05-10

## 2023-05-10 NOTE — PATIENT INSTRUCTIONS
No medication changes  Before next office visit CBC, CMP, lipid (labs)  Continue risk factor modifications. Call for any change in symptoms, call to report any changes in shortness of breath or development of chest pain with activity.     Follow up in 6 mos

## 2023-05-17 ENCOUNTER — NURSE ONLY (OUTPATIENT)
Dept: CARDIOLOGY CLINIC | Age: 68
End: 2023-05-17

## 2023-05-17 DIAGNOSIS — Z95.810 CARDIAC DEFIBRILLATOR IN PLACE: ICD-10-CM

## 2023-05-17 DIAGNOSIS — I47.20 VENTRICULAR TACHYCARDIA (HCC): Primary | ICD-10-CM

## 2023-05-18 NOTE — PROGRESS NOTES
We received remote transmission from patient's monitor at home. Transmission shows patients ICD has normal sensing and pacing function. Noted AT/AF and VT. VT required successful ATP. Pt is on Toprol. Pt declines anti coags. EP physician will review. See interrogation under cardiology tab in the [address] [place] [address] [address] field for more details. Total  < 0.1% (MVP On)  AS-VS 29.0%  AS- < 0.1%  AP-VS 70.9%  AP- < 0.1%      Optivol is within normal range. End of 91-day monitoring period 5-17-23.

## 2023-08-15 ENCOUNTER — TELEPHONE (OUTPATIENT)
Dept: CARDIOLOGY CLINIC | Age: 68
End: 2023-08-15

## 2023-08-15 NOTE — TELEPHONE ENCOUNTER
Please have him send a manual remote transmission when he returns to insure we have received his transmission.  Thanks

## 2023-08-15 NOTE — TELEPHONE ENCOUNTER
Pt states he will be out of town for next two days and wants to know if the remote transmission can be moved to Thursday night.

## 2023-08-26 PROCEDURE — 93295 DEV INTERROG REMOTE 1/2/MLT: CPT | Performed by: INTERNAL MEDICINE

## 2023-08-26 PROCEDURE — 93296 REM INTERROG EVL PM/IDS: CPT | Performed by: INTERNAL MEDICINE

## 2023-09-27 ENCOUNTER — TELEPHONE (OUTPATIENT)
Dept: PHARMACY | Facility: CLINIC | Age: 68
End: 2023-09-27

## 2023-09-27 NOTE — TELEPHONE ENCOUNTER
1    =======================================================   For Pharmacy Admin Tracking Only    Program: Abbe in place:  No  Recommendation Provided To: Patient/Caregiver: 1 via Telephone  Intervention Detail: Adherence Monitorin  Intervention Accepted By: Patient/Caregiver: 0  Gap Closed?: No   Time Spent (min): 15

## 2023-10-25 NOTE — PROGRESS NOTES
[facility]   Cardiac Follow Up     Referring Provider:  Fatimah Foster MD     Chief Complaint   Patient presents with    6 Month Follow-Up     No c/c        History of Present Illness:  Brooke Muller is a 75 y.o. 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 He is well aware of risk of stroke). 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 His next device check is 5/16/22. His open heart surgery x2 was at [facility].     Today, John Davis is here for a 6 month follow up. He is accompanied to the office visit by his Wife. States that he has been doing well. Reports that he has recently been seen by Dr. Priyank Vega to monitor his kidney function and was started on flomax and losartan. Unsure as to why he is now taking flomax. States that he has always used a urinal a couple of times at night and has not noticed a difference since starting the flomax. Wife reports that he has stopped Pepsi and is on a low sugar tea and more water. Patient denies exertional chest pain, ELLISON/PND, palpitations, light-headedness, edema. Past Medical History:   has a past medical history of CAD (coronary artery disease), CVA (cerebrovascular accident) ([address]), Hyperlipidemia, Hypertension, MI (myocardial infarction) ([address]), and VT (ventricular tachycardia) ([address] Surgical History:   has a past surgical history that includes Diagnostic Cardiac Cath Lab Procedure; Cardiac surgery; and Cardiac defibrillator placement (8/27/15). Social History:   reports that he quit smoking about 8 years ago. His smoking use included cigarettes.  He has never used smokeless

## 2023-11-22 ENCOUNTER — OFFICE VISIT (OUTPATIENT)
Dept: CARDIOLOGY CLINIC | Age: 68
End: 2023-11-22
Payer: MEDICARE

## 2023-11-22 VITALS
WEIGHT: 220 LBS | HEIGHT: 71 IN | DIASTOLIC BLOOD PRESSURE: 82 MMHG | SYSTOLIC BLOOD PRESSURE: 140 MMHG | OXYGEN SATURATION: 96 % | HEART RATE: 77 BPM | BODY MASS INDEX: 30.8 KG/M2

## 2023-11-22 DIAGNOSIS — I48.3 TYPICAL ATRIAL FLUTTER (HCC): ICD-10-CM

## 2023-11-22 DIAGNOSIS — E78.2 MIXED HYPERLIPIDEMIA: Chronic | ICD-10-CM

## 2023-11-22 DIAGNOSIS — I10 ESSENTIAL HYPERTENSION, BENIGN: Chronic | ICD-10-CM

## 2023-11-22 DIAGNOSIS — I25.810 CORONARY ARTERY DISEASE INVOLVING CORONARY BYPASS GRAFT OF NATIVE HEART WITHOUT ANGINA PECTORIS: Primary | ICD-10-CM

## 2023-11-22 DIAGNOSIS — I05.9 MITRAL VALVE DISORDER: Chronic | ICD-10-CM

## 2023-11-22 PROCEDURE — 3078F DIAST BP <80 MM HG: CPT | Performed by: INTERNAL MEDICINE

## 2023-11-22 PROCEDURE — 3074F SYST BP LT 130 MM HG: CPT | Performed by: INTERNAL MEDICINE

## 2023-11-22 PROCEDURE — 99214 OFFICE O/P EST MOD 30 MIN: CPT | Performed by: INTERNAL MEDICINE

## 2023-11-22 PROCEDURE — 1123F ACP DISCUSS/DSCN MKR DOCD: CPT | Performed by: INTERNAL MEDICINE

## 2023-11-22 RX ORDER — LOSARTAN POTASSIUM 50 MG/1
50 TABLET ORAL DAILY
Qty: 90 TABLET | Refills: 3 | Status: SHIPPED | OUTPATIENT
Start: 2023-11-22

## 2023-11-22 RX ORDER — LOSARTAN POTASSIUM 25 MG/1
25 TABLET ORAL DAILY
COMMUNITY
Start: 2023-10-10 | End: 2023-11-22

## 2023-11-22 RX ORDER — TAMSULOSIN HYDROCHLORIDE 0.4 MG/1
0.4 CAPSULE ORAL NIGHTLY
COMMUNITY
Start: 2023-10-10

## 2023-11-25 PROCEDURE — 93295 DEV INTERROG REMOTE 1/2/MLT: CPT | Performed by: INTERNAL MEDICINE

## 2023-11-25 PROCEDURE — 93296 REM INTERROG EVL PM/IDS: CPT | Performed by: INTERNAL MEDICINE

## 2024-02-27 NOTE — PROGRESS NOTES
[facility]   Cardiac Follow Up     Referring Provider:  Joya Agustin MD     Chief Complaint   Patient presents with    Hyperlipidemia    Coronary Artery Disease    Cardiomyopathy    3 Month Follow-Up        History of Present Illness:  Kendrick Don is a 68 y.o. 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 He is well aware of risk of stroke).   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 His next device check is 5/16/22. His open heart surgery x2 was at [facility].     Today, Kendrick is here for a 3 month follow up. He is accompanied by his Wife today and he is ambulating with a single point cane.  States that they go to the [facility] every once in a while and they go up to [place] to see their Grandchildren and he has tolerated activities well.  Wife reports that he has been seeing a Nephrologist and he is supposed to have more labs done next week.  Patient denies exertional chest pain, ELLISON/PND, palpitations, light-headedness, edema.      Past Medical History:   has a past medical history of CAD (coronary artery disease), CVA (cerebrovascular accident) (HCC), Hyperlipidemia, Hypertension, MI (myocardial infarction) ([facility]), and VT (ventricular tachycardia) ([facility]).    Surgical History:   has a past surgical history that includes Diagnostic Cardiac Cath Lab Procedure; Cardiac surgery; and Cardiac defibrillator placement (8/27/15).     Social History:   reports that he quit smoking about 8 years ago. His smoking use included cigarettes. He started smoking about 48 years ago. He has never used smokeless tobacco. He reports current alcohol 
hives.      Physical Examination:    There were no vitals filed for this visit.          Wt Readings from Last 1 Encounters:   11/22/23 99.8 kg (220 lb)       Constitutional and General Appearance: NAD   Skin:good turgor,intact without lesions  HEENT: EOMI ,normal  Neck:no JVD    Respiratory:  Normal excursion and expansion without use of accessory muscles  Resp Auscultation: Normal breath sounds without dullness  Cardiovascular:  The apical impulses not displaced  Heart tones are crisp and normal  Cervical veins are not engorged  The carotid upstroke is normal in amplitude and contour without delay or bruit  2/6 Holosystolic Murmur   Peripheral pulses are symmetrical and full  There is no clubbing, cyanosis of the extremities.  No edema  Femoral Arteries: 2+ and equal  Pedal Pulses: 2+ and equal   Abdomen:  No masses or tenderness  Liver/Spleen: No Abnormalities Noted  Neurological/Psychiatric:  Alert and oriented in all spheres  Moves all extremities well  Left arm paralyzed,limping gait  No abnormalities of mood, affect, memory, mentation, or behavior are noted    5/11/22 > echo  Summary -Left ventricular cavity size is mildly dilated with mild concentric left ventricular hypertrophy. -Overall left ventricular systolic function appears mildly reduced with an ejection fraction in the 40-45% range. -There is severe hypokinesis of the basal inferoseptal walls with an inferobasal aneurysmal defect. -Indeterminate diastolic function due to heavy mitral annular calcification from mitral ring. -An annuloplasty ring is seen in the mitral position. -Trivial mitral regurgitation. -Aortic valve appears sclerotic but opens adequately. -Trivial tricuspid regurgitation. -Estimated pulmonary artery systolic pressure is normal at 32 mmHg assuming a right atrial pressure of 3 mmHg. -The left atrium is mildly dilated. -Pacemaker / ICD lead is visualized in the right heart.    Assessment:    1. Coronary artery disease involving

## 2024-02-28 ENCOUNTER — OFFICE VISIT (OUTPATIENT)
Dept: CARDIOLOGY CLINIC | Age: 69
End: 2024-02-28
Payer: MEDICARE

## 2024-02-28 VITALS
OXYGEN SATURATION: 98 % | WEIGHT: 221 LBS | HEART RATE: 86 BPM | SYSTOLIC BLOOD PRESSURE: 148 MMHG | DIASTOLIC BLOOD PRESSURE: 76 MMHG | HEIGHT: 71 IN | BODY MASS INDEX: 30.94 KG/M2

## 2024-02-28 DIAGNOSIS — I05.9 MITRAL VALVE DISORDER: Primary | ICD-10-CM

## 2024-02-28 DIAGNOSIS — I25.810 CORONARY ARTERY DISEASE INVOLVING CORONARY BYPASS GRAFT OF NATIVE HEART WITHOUT ANGINA PECTORIS: ICD-10-CM

## 2024-02-28 DIAGNOSIS — E78.2 MIXED HYPERLIPIDEMIA: ICD-10-CM

## 2024-02-28 DIAGNOSIS — I48.3 TYPICAL ATRIAL FLUTTER (HCC): ICD-10-CM

## 2024-02-28 DIAGNOSIS — I10 ESSENTIAL HYPERTENSION, BENIGN: ICD-10-CM

## 2024-02-28 PROCEDURE — 3078F DIAST BP <80 MM HG: CPT | Performed by: INTERNAL MEDICINE

## 2024-02-28 PROCEDURE — 99214 OFFICE O/P EST MOD 30 MIN: CPT | Performed by: INTERNAL MEDICINE

## 2024-02-28 PROCEDURE — 3077F SYST BP >= 140 MM HG: CPT | Performed by: INTERNAL MEDICINE

## 2024-02-28 PROCEDURE — 1123F ACP DISCUSS/DSCN MKR DOCD: CPT | Performed by: INTERNAL MEDICINE

## 2024-03-11 ENCOUNTER — TELEPHONE (OUTPATIENT)
Dept: CARDIOLOGY CLINIC | Age: 69
End: 2024-03-11

## 2024-03-11 RX ORDER — METOPROLOL SUCCINATE 50 MG/1
TABLET, EXTENDED RELEASE ORAL
Qty: 90 TABLET | Refills: 3 | OUTPATIENT
Start: 2024-03-11

## 2024-03-11 RX ORDER — METOPROLOL SUCCINATE 50 MG/1
50 TABLET, EXTENDED RELEASE ORAL DAILY
Qty: 90 TABLET | Refills: 3 | Status: SHIPPED | OUTPATIENT
Start: 2024-03-11

## 2024-03-11 RX ORDER — METOPROLOL SUCCINATE 100 MG/1
100 TABLET, EXTENDED RELEASE ORAL DAILY
Qty: 90 TABLET | Refills: 3 | Status: SHIPPED | OUTPATIENT
Start: 2024-03-11

## 2024-03-11 NOTE — TELEPHONE ENCOUNTER
Medication Refill    Medication needing refilled:    metoprolol succinate (TOPROL XL) 100 MG extended release tablet   Dosage of the medication:    How are you taking this medication (QD, BID, TID, QID, PRN):  Take a total of 150 mg toprol XL daily - a 100 mg tablet and a 50 mg tablet   30 or 90 day supply called in:90    When will you run out of your medication:  Pt need from [facility] now  Which Pharmacy are we sending the medication to?:  [id number] - [facility] [address] - P [phone] - F [phone]

## 2024-03-14 RX ORDER — METOPROLOL SUCCINATE 50 MG/1
TABLET, EXTENDED RELEASE ORAL
Qty: 90 TABLET | Refills: 3 | Status: SHIPPED | OUTPATIENT
Start: 2024-03-14

## 2024-03-14 NOTE — TELEPHONE ENCOUNTER
Medication Refill    Medication needing refilled:  metoprolol succinate     Dosage of the medication:  50mg    How are you taking this medication (QD, BID, TID, QID, PRN):  Take 1 tablet by mouth daily TOTAL  MG DAILY     30 or 90 day supply called in:  90      When will you run out of your medication:    Which Pharmacy are we sending the medication to?:    [facility] [address] [address]  Phone: [phone]    Fax: [phone]     NOTE:  [facility] called to request the script for 50mg as they did not get it.   Francisco Javier did get the 100mg.  Please re- send.  Thank you

## 2024-03-14 NOTE — TELEPHONE ENCOUNTER
Received refill request for metoprolol from [facility] pharmacy.    Last ov: 2/28/2024 LES    Last Refill: 05/10/2023 #90 w/ 3 refills    Next appointment: 08/01/2024 LES

## 2024-03-25 RX ORDER — METOPROLOL SUCCINATE 100 MG/1
100 TABLET, EXTENDED RELEASE ORAL DAILY
Qty: 30 TABLET | Refills: 3 | Status: SHIPPED | OUTPATIENT
Start: 2024-03-25

## 2024-06-01 DIAGNOSIS — I25.810 CORONARY ARTERY DISEASE INVOLVING CORONARY BYPASS GRAFT OF NATIVE HEART WITHOUT ANGINA PECTORIS: ICD-10-CM

## 2024-06-03 RX ORDER — ATORVASTATIN CALCIUM 20 MG/1
20 TABLET, FILM COATED ORAL DAILY
Qty: 90 TABLET | Refills: 3 | Status: SHIPPED | OUTPATIENT
Start: 2024-06-03

## 2024-06-03 NOTE — TELEPHONE ENCOUNTER
Received refill request for atorvastatin from [facility] pharmacy.    Last ov: 02/28/2024 LES    Last Refill: 05/10/2023 #90 w/ 3 refills    Next appointment: 08/01/2024 LES

## 2024-06-04 RX ORDER — AMLODIPINE BESYLATE 5 MG/1
5 TABLET ORAL DAILY
Qty: 90 TABLET | Refills: 0 | Status: SHIPPED | OUTPATIENT
Start: 2024-06-04

## 2024-07-18 NOTE — PROGRESS NOTES
(Final)    Interpretation Summary    Left Ventricle: Mildly reduced left ventricular systolic function with a visually estimated EF of 45 - 50%. Left ventricle is mildly dilated. Mildly increased wall thickness. Severe hypokinesis of the following segments: basal inferior and basal inferoseptal. 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 Indeterminate diastolic function due to mitral valve surgery. E/A ratio is 1.34. Average E/e' ratio is 42.25.    Right Ventricle: Normal systolic function. TAPSE is normal. TAPSE is 1.6 cm. RV Peak S' is 10 cm/s.    Aortic Valve: Mild sclerosis of the aortic valve cusps.    Mitral Valve: Valve repaired by annular ring. Mild regurgitation. No stenosis noted. Normal post valve repair gradient. MV mean gradient is 5 mmHg.    Tricuspid Valve: Mild regurgitation. The estimated RVSP is 30 mmHg.    Pulmonic Valve: Mild regurgitation.    Left Atrium: Left atrium is moderately dilated. Left atrial volume index is moderately increased (42-48 mL/m2).    Right Atrium: Lead present in the right atrium. Right atrium size is normal.    Aorta: Normal sized aortic root and ascending aorta. Ao root diameter is 3.5 cm. Ao ascending diameter is 3.2 cm.    Pericardium: No pericardial effusion.    IVC/SVC: IVC diameter is less than or equal to 21 mm and decreases greater than 50% during inspiration; therefore the estimated right atrial pressure is normal (~3 mmHg). IVC size is normal.    Image quality is technically difficult. Limited Doppler study due to patient's condition.    Signed by: Darnell Bess MD on 8/1/2024 11:31 AM    5/11/22 > echo  Summary -Left ventricular cavity size is mildly dilated with mild concentric left ventricular hypertrophy. -Overall left ventricular systolic function appears mildly reduced with an ejection fraction in the 40-45% range. -There is severe hypokinesis of the basal inferoseptal walls with an inferobasal aneurysmal defect. -Indeterminate diastolic function

## 2024-08-01 ENCOUNTER — OFFICE VISIT (OUTPATIENT)
Dept: CARDIOLOGY CLINIC | Age: 69
End: 2024-08-01
Payer: MEDICARE

## 2024-08-01 VITALS
OXYGEN SATURATION: 98 % | BODY MASS INDEX: 29.96 KG/M2 | SYSTOLIC BLOOD PRESSURE: 152 MMHG | WEIGHT: 214 LBS | DIASTOLIC BLOOD PRESSURE: 70 MMHG | HEIGHT: 71 IN | HEART RATE: 63 BPM

## 2024-08-01 DIAGNOSIS — I10 ESSENTIAL HYPERTENSION, BENIGN: ICD-10-CM

## 2024-08-01 DIAGNOSIS — I48.3 TYPICAL ATRIAL FLUTTER (HCC): ICD-10-CM

## 2024-08-01 DIAGNOSIS — I05.9 MITRAL VALVE DISORDER: Primary | ICD-10-CM

## 2024-08-01 DIAGNOSIS — I25.810 CORONARY ARTERY DISEASE INVOLVING CORONARY BYPASS GRAFT OF NATIVE HEART WITHOUT ANGINA PECTORIS: ICD-10-CM

## 2024-08-01 DIAGNOSIS — E78.2 MIXED HYPERLIPIDEMIA: ICD-10-CM

## 2024-08-01 PROCEDURE — 3077F SYST BP >= 140 MM HG: CPT | Performed by: INTERNAL MEDICINE

## 2024-08-01 PROCEDURE — 3078F DIAST BP <80 MM HG: CPT | Performed by: INTERNAL MEDICINE

## 2024-08-01 PROCEDURE — 1123F ACP DISCUSS/DSCN MKR DOCD: CPT | Performed by: INTERNAL MEDICINE

## 2024-08-01 PROCEDURE — 99214 OFFICE O/P EST MOD 30 MIN: CPT | Performed by: INTERNAL MEDICINE

## 2024-08-20 ENCOUNTER — TELEPHONE (OUTPATIENT)
Dept: CARDIOLOGY CLINIC | Age: 69
End: 2024-08-20

## 2024-11-04 RX ORDER — AMLODIPINE BESYLATE 5 MG/1
5 TABLET ORAL DAILY
Qty: 90 TABLET | Refills: 3 | Status: SHIPPED | OUTPATIENT
Start: 2024-11-04

## 2024-11-04 NOTE — TELEPHONE ENCOUNTER
Medication Refill    Medication needing refilled:    amLODIPine (NORVASC) 5 MG    Dosage of the medication:    How are you taking this medication (QD, BID, TID, QID, PRN):1 TABLET BY MOUTH DAILY     30 or 90 day supply called in: 90 day supply    When will you run out of your medication:    Which Pharmacy are we sending the medication to?:   [id number] - [facility] [address] - P [phone] - F [phone]

## 2024-11-04 NOTE — TELEPHONE ENCOUNTER
Received refill request for amlodipine from [facility] pharmacy.    Last ov: 08/01/2024 LES    Last Refill: 06/04/2024 #90 w/ 0    Next appointment: 02/07/2025 LES

## 2025-01-16 NOTE — PROGRESS NOTES
pressure of 3 mmHg. -The left atrium is mildly dilated. -Pacemaker / ICD lead is visualized in the right heart.    Assessment:    1. Coronary artery disease involving coronary bypass graft of native heart without angina pectoris   ~stable   ~ Denies anginal symptoms     ~s/p CABG '93, redo '08  ~fixed defect on myoview '17  ~remain on ASA/statin/BB       2. Mitral valve disorder   ~hx of endocarditis   ~s/p mitral valve repair  ~stable on echo 6/21   ~ echo 5/11/22 > stable  Echo every other year   echo 8/1/24 > stable   Handicap placard script given 8/1/24 for 2 placards        3. Essential hypertension, benign   ~controlled   Blood pressure (!) 170/92, pulse 82, height 1.803 m (5' 11\"), weight 101.4 kg (223 lb 9.6 oz), SpO2 99%.  Remain on amlodipine and toprol > based on history of stroke, will not change regimen  - Losartan 25mg daily - added on 10/10/23 by Dr. Le - Nephrologist - increased to 50mg daily 11/22/23  - discussed stopping amlodipine if BP improves with higher dose of Losartan  Remain on amlodipine and toprol and losartan    Bp today > rechecked by me > right arm, sitting > 172/76     Increase losartan to 100 mg daily        4. Typical atrial flutter (HCC)   ~controlled AP       5.     Mixed hyperlipidemia              ~controlled               ~ continue on atorvastatin 20 mg   9/16/21  TG 54 HDL 80 LDL 83              11/22/22  TG 68  HDL 57 LDL 88    9/27/23  TG 86 HDL 52 LDL 97                Labs today    Plan:   2/7/25  Cardiac test and lab results personally reviewed by me during this office visit and discussed.     Will need PM battery replacement this year  Increase losartan to 100 mg daily  Labs > cmp, cbc, lipids today  Continue risk factor modifications.   Call for any change in symptoms, call to report any changes in shortness of breath or development of chest pain with activity.    Follow up in 6 mos with echo same day      I appreciate the opportunity of cooperating

## 2025-01-31 NOTE — TELEPHONE ENCOUNTER
Medication Refill    Medication needing refilled:  losartan (COZAAR) 50 MG   Dosage of the medication:    How are you taking this medication (QD, BID, TID, QID, PRN): 1 tablet by mouth daily     30 or 90 day supply called in: 90 day supply    When will you run out of your medication:    Which Pharmacy are we sending the medication to?: [id number] - [facility] [address] - P [phone] - F [phone]

## 2025-01-31 NOTE — TELEPHONE ENCOUNTER
Received refill request for Losartan  from [facility]  pharmacy.     Last OV: 8/1/2024 LES     Next OV: 2/7/2025 LES     Last Labs: CMP 3/13/20

## 2025-02-03 ENCOUNTER — TELEPHONE (OUTPATIENT)
Dept: CARDIOLOGY CLINIC | Age: 70
End: 2025-02-03

## 2025-02-03 RX ORDER — LOSARTAN POTASSIUM 50 MG/1
50 TABLET ORAL DAILY
Qty: 90 TABLET | Refills: 3 | Status: SHIPPED | OUTPATIENT
Start: 2025-02-03 | End: 2025-02-07 | Stop reason: SDUPTHER

## 2025-02-03 NOTE — TELEPHONE ENCOUNTER
Spoke with patient's wife to let her know RX was already sent to [facility] pharmacy. Voiced understanding.

## 2025-02-03 NOTE — TELEPHONE ENCOUNTER
Medication Refill    Medication needing refilled: losartan (COZAAR) 50 MG tablet     Dosage of the medication: 50mg    How are you taking this medication (QD, BID, TID, QID, PRN):  Take 1 tablet by mouth daily     30 or 90 day supply called in: 90    When will you run out of your medication:    Which Pharmacy are we sending the medication to?:  [id number] - [facility] [address] - P [phone] - F [phone]

## 2025-02-07 ENCOUNTER — OFFICE VISIT (OUTPATIENT)
Dept: CARDIOLOGY CLINIC | Age: 70
End: 2025-02-07

## 2025-02-07 VITALS
HEART RATE: 82 BPM | WEIGHT: 223.6 LBS | OXYGEN SATURATION: 99 % | SYSTOLIC BLOOD PRESSURE: 172 MMHG | HEIGHT: 71 IN | DIASTOLIC BLOOD PRESSURE: 76 MMHG | BODY MASS INDEX: 31.3 KG/M2

## 2025-02-07 DIAGNOSIS — I25.810 CORONARY ARTERY DISEASE INVOLVING CORONARY BYPASS GRAFT OF NATIVE HEART WITHOUT ANGINA PECTORIS: Primary | ICD-10-CM

## 2025-02-07 DIAGNOSIS — Z95.1 S/P CABG (CORONARY ARTERY BYPASS GRAFT): ICD-10-CM

## 2025-02-07 DIAGNOSIS — R06.00 DYSPNEA, UNSPECIFIED TYPE: ICD-10-CM

## 2025-02-07 DIAGNOSIS — E78.2 MIXED HYPERLIPIDEMIA: ICD-10-CM

## 2025-02-07 DIAGNOSIS — I05.9 MITRAL VALVE DISORDER: ICD-10-CM

## 2025-02-07 DIAGNOSIS — I48.91 ATRIAL FIBRILLATION, UNSPECIFIED TYPE (HCC): ICD-10-CM

## 2025-02-07 DIAGNOSIS — I10 ESSENTIAL HYPERTENSION, BENIGN: ICD-10-CM

## 2025-02-07 DIAGNOSIS — I48.3 TYPICAL ATRIAL FLUTTER (HCC): ICD-10-CM

## 2025-02-07 RX ORDER — LOSARTAN POTASSIUM 100 MG/1
100 TABLET ORAL DAILY
Qty: 90 TABLET | Refills: 3 | Status: SHIPPED | OUTPATIENT
Start: 2025-02-07

## 2025-02-07 NOTE — PATIENT INSTRUCTIONS
Increase losartan to 100 mg daily  Labs > cmp, cbc, lipids  Continue risk factor modifications.   Call for any change in symptoms, call to report any changes in shortness of breath or development of chest pain with activity.    Follow up in 6 mos with echo same day

## 2025-03-03 ENCOUNTER — TELEPHONE (OUTPATIENT)
Dept: CARDIOLOGY CLINIC | Age: 70
End: 2025-03-03

## 2025-03-03 NOTE — TELEPHONE ENCOUNTER
We received remote transmission from patient's monitor at home. Transmission shows normal sensing and pacing function.   1 Shocked terminated episodes of AF with RVR noted on 3/2/2025-AF episode was noted since 2/27/2025.   Current ECG   EP will review.   See interrogation in Murj or Carelink for more detail.    Please advise. Thanks

## 2025-03-03 NOTE — TELEPHONE ENCOUNTER
Please call and check on pt. If feeling better, no changes, but he needs to see Dr. Joel in next 1-2 weeks (not seen by EP since 2022). If he feels poorly, recommend he go to the ER

## 2025-03-03 NOTE — TELEPHONE ENCOUNTER
Called and spoke with patients wife. They are out to lunch at this time. She states patient is feeling well. They asked that I call back later this evening to schedule an appointment when they can look at their schedule closer.

## 2025-03-03 NOTE — TELEPHONE ENCOUNTER
I spoke with wife . She aid that the device alarmed him this morning.Pt states he has no s/s but felt a sock last night.

## 2025-03-03 NOTE — TELEPHONE ENCOUNTER
EP Triage Questionnaire  Does the patient have a device? [x]Y  [] N, If yes ask them to please send an interrogation.  Sent transmission    What is their HR and BP?  Wife has not taken it    Have they had a recent procedure?   No   What symptoms are they having?    Pt was shocked in arm yesterday and today machine alarmed. Pt states he feels ok. Wife states both of them have been sick, for the last 10 days. She states she had Covid.  How long have they had symptoms?       Yesterday  Have they taken or missed medications recently and if so which ones?   Missed medications on Saturday

## 2025-03-05 NOTE — PROGRESS NOTES
Attestation: I, Dr. Wisam villar, confirm that the scribe's documentation has been prepared under my direction and personally reviewed by me in its entirety.  I also confirm that the note above accurately reflects all work, treatment, procedures, and medical decision making performed by me.    The patient (or guardian, if applicable) and other individuals in attendance with the patient were advised that Artificial Intelligence will be utilized during this visit to record, process the conversation to generate a clinical note, and support improvement of the AI technology. The patient (or guardian, if applicable) and other individuals in attendance at the appointment consented to the use of AI, including the recording.      NOTE: This report was transcribed using voice recognition software. Every effort was made to ensure accuracy, however, inadvertent computerized transcription errors may be present.     Wisam Villar MD, MPH  [facility]   Office: [phone]  Fax: [phone]

## 2025-03-20 ENCOUNTER — OFFICE VISIT (OUTPATIENT)
Dept: CARDIOLOGY CLINIC | Age: 70
End: 2025-03-20
Payer: MEDICARE

## 2025-03-20 ENCOUNTER — CLINICAL SUPPORT (OUTPATIENT)
Dept: CARDIOLOGY CLINIC | Age: 70
End: 2025-03-20

## 2025-03-20 VITALS
DIASTOLIC BLOOD PRESSURE: 78 MMHG | OXYGEN SATURATION: 98 % | WEIGHT: 218.2 LBS | BODY MASS INDEX: 30.55 KG/M2 | SYSTOLIC BLOOD PRESSURE: 140 MMHG | HEIGHT: 71 IN | HEART RATE: 87 BPM

## 2025-03-20 DIAGNOSIS — I48.3 TYPICAL ATRIAL FLUTTER (HCC): Primary | ICD-10-CM

## 2025-03-20 DIAGNOSIS — I47.20 VENTRICULAR TACHYCARDIA (HCC): ICD-10-CM

## 2025-03-20 DIAGNOSIS — I25.5 ISCHEMIC CARDIOMYOPATHY: ICD-10-CM

## 2025-03-20 DIAGNOSIS — I48.0 PAF (PAROXYSMAL ATRIAL FIBRILLATION) (HCC): ICD-10-CM

## 2025-03-20 DIAGNOSIS — I05.9 MITRAL VALVE DISORDER: Chronic | ICD-10-CM

## 2025-03-20 DIAGNOSIS — I10 ESSENTIAL HYPERTENSION, BENIGN: Chronic | ICD-10-CM

## 2025-03-20 DIAGNOSIS — I25.10 ATHEROSCLEROSIS OF NATIVE CORONARY ARTERY OF NATIVE HEART WITHOUT ANGINA PECTORIS: ICD-10-CM

## 2025-03-20 DIAGNOSIS — I48.3 TYPICAL ATRIAL FLUTTER (HCC): ICD-10-CM

## 2025-03-20 DIAGNOSIS — Z95.810 AICD PRESENT, DOUBLE CHAMBER: Primary | ICD-10-CM

## 2025-03-20 PROCEDURE — 1126F AMNT PAIN NOTED NONE PRSNT: CPT | Performed by: INTERNAL MEDICINE

## 2025-03-20 PROCEDURE — 3078F DIAST BP <80 MM HG: CPT | Performed by: INTERNAL MEDICINE

## 2025-03-20 PROCEDURE — 99215 OFFICE O/P EST HI 40 MIN: CPT | Performed by: INTERNAL MEDICINE

## 2025-03-20 PROCEDURE — G2211 COMPLEX E/M VISIT ADD ON: HCPCS | Performed by: INTERNAL MEDICINE

## 2025-03-20 PROCEDURE — 1123F ACP DISCUSS/DSCN MKR DOCD: CPT | Performed by: INTERNAL MEDICINE

## 2025-03-20 PROCEDURE — 1159F MED LIST DOCD IN RCRD: CPT | Performed by: INTERNAL MEDICINE

## 2025-03-20 PROCEDURE — 93000 ELECTROCARDIOGRAM COMPLETE: CPT | Performed by: INTERNAL MEDICINE

## 2025-03-20 PROCEDURE — 3077F SYST BP >= 140 MM HG: CPT | Performed by: INTERNAL MEDICINE

## 2025-03-20 RX ORDER — METOPROLOL SUCCINATE 50 MG/1
100 TABLET, EXTENDED RELEASE ORAL NIGHTLY
Qty: 90 TABLET | Refills: 3
Start: 2025-03-20

## 2025-03-20 RX ORDER — METOPROLOL SUCCINATE 100 MG/1
100 TABLET, EXTENDED RELEASE ORAL EVERY MORNING
Qty: 90 TABLET | Refills: 3
Start: 2025-03-20

## 2025-03-20 NOTE — PATIENT INSTRUCTIONS
Obtain blood work  Generator change once device is HAMMAD  Increase metoprolol to 200 mg daily. Can take 100 mg in the morning and 100 mg in the evening    You are scheduled for a ICD battery change    Our  will call you to discuss a date for you procedure.    The Cath Lab will call you a week before your procedure.    Lab work is required, please ensure this is completed within the 30 days prior to your procedure.     PRE-PROCEDURE  INSTRUCTIONS   -One week before please start washing with an antibacterial soap, when showering. The night before your procedure you will need to scrub with Hibiclens wash.   -You will need to fast for at least 8 hours prior to your procedure.   -The day of your procedure you will need to check in at the registration desk, which is in the main lobby at [facility].  -You will need to hold your Eliquis for 2 days before the procedure  -Stop taking GLP-1 (Mounjaro, Ozempic, Trulicity, Zepbound, Wegovy, Vitoza) one week before your procedure.  -Stop SGLT2 (Jardiance, Farxiga, Invokana, Brenzavvy, Steglatro) the day before your procedure and the morning of your procedure.    -You may hold your diuretic (water pill) the morning of your procedure.   -You may take all other medications with a sip of water the morning of your procedure.   -Please have a responsible adult to drive you home upon discharge.   -The discharging unit will be giving you discharge instructions.    If you have any questions regarding your procedure itself or your medications, please call [phone] and ask to talk to an EP nurse.    You will be seen in the office in 1 week for a wound check and then 3 months following implantation.

## 2025-04-04 ENCOUNTER — TELEPHONE (OUTPATIENT)
Dept: CARDIOLOGY CLINIC | Age: 70
End: 2025-04-04

## 2025-04-04 RX ORDER — METOPROLOL SUCCINATE 100 MG/1
100 TABLET, EXTENDED RELEASE ORAL 2 TIMES DAILY
Qty: 180 TABLET | Refills: 3 | Status: SHIPPED | OUTPATIENT
Start: 2025-04-04

## 2025-04-04 NOTE — TELEPHONE ENCOUNTER
Patient has a Medtronic dual chamber ICD. Received a CareAlert that the patient was shocked yesterday. On 4/3/25 @ 1732-- appears the patient had a VT/ VF event that lasted 14 seconds and was treated successfully with ATP x 2 followed by shock x 1. Hx of shocks. Toprol dose was recently increased per Dr. Joel (no longer on amiodarone due to abnormal TSH). Battery has approximately 1 month until it triggers HAMMAD/ RRT. Sent to Dr. Joel, via Triton, for review. Thanks.

## 2025-04-04 NOTE — TELEPHONE ENCOUNTER
Call placed to patient to discuss device discharge. He was walking into the bedroom and then sat down. He did not lose consciousness.He never increased his toprol XL to 200 mg  (100 mg BID), encouraged him to do so to help reduce his chances of having repeat ICD discharge. I also scheduled patient to come into the device clinic to have his alerts reset. Will discuss further with RMM and if anything further is needed will contact the patient and his wife

## 2025-04-04 NOTE — TELEPHONE ENCOUNTER
Spoke with RMM. If rate remains high after increase of toprol we can consider adding digoxin  No changes at this time

## 2025-04-07 ENCOUNTER — CLINICAL SUPPORT (OUTPATIENT)
Dept: CARDIOLOGY CLINIC | Age: 70
End: 2025-04-07

## 2025-04-07 DIAGNOSIS — I48.0 PAF (PAROXYSMAL ATRIAL FIBRILLATION) (HCC): ICD-10-CM

## 2025-04-07 DIAGNOSIS — Z95.810 AICD PRESENT, DOUBLE CHAMBER: Primary | ICD-10-CM

## 2025-04-07 DIAGNOSIS — I47.20 VENTRICULAR TACHYCARDIA (HCC): ICD-10-CM

## 2025-04-07 DIAGNOSIS — I48.3 TYPICAL ATRIAL FLUTTER (HCC): ICD-10-CM

## 2025-04-07 DIAGNOSIS — I25.5 ISCHEMIC CARDIOMYOPATHY: ICD-10-CM

## 2025-06-05 ENCOUNTER — TELEPHONE (OUTPATIENT)
Dept: CARDIOLOGY CLINIC | Age: 70
End: 2025-06-05

## 2025-06-05 NOTE — TELEPHONE ENCOUNTER
Per LOV with RMM, generator change was discussed and may proceed once it reached HAMMAD.Patient notified and agreeable to proceeding. Pre procedure instructions were provided during LOV.     You are scheduled for a ICD battery change  Our  will call you to discuss a date for you procedure.  The Cath Lab will call you a week before your procedure.  Lab work is required, please ensure this is completed within the 30 days prior to your procedure.    PRE-PROCEDURE INSTRUCTIONS  -One week before please start washing with an antibacterial soap, when showering. The night before your  procedure you will need to scrub with Hibiclens wash.  -You will need to fast for at least 8 hours prior to your procedure.  -The day of your procedure you will need to check in at the registration desk, which is in the main lobby at  [facility].  -You will need to hold your Eliquis for 2 days before the procedure  -Stop taking GLP-1 (Mounjaro, Ozempic, Trulicity, Zepbound, Wegovy, Vitoza) one week before your procedure.  -Stop SGLT2 (Jardiance, Farxiga, Invokana, Brenzavvy, Steglatro) the day before your procedure and the morning  of your procedure.  -You may hold your diuretic (water pill) the morning of your procedure.  -You may take all other medications with a sip of water the morning of your procedure.  -Please have a responsible adult to drive you home upon discharge.  -The discharging unit will be giving you discharge instructions.  If you have any questions regarding your procedure itself or your medications, please call [phone] and ask to talk  to an EP nurse.  You will be seen in the office in 1 week for a wound check and then 3 months following implantation.

## 2025-06-05 NOTE — TELEPHONE ENCOUNTER
PATRICE. Patients remote shows his ICD has reached the HAMMAD. Report sent to [facility] for review. Thanks.

## 2025-06-16 NOTE — TELEPHONE ENCOUNTER
Called and spoke to the patients wife and gave her date and time but she was not home to write everything down so she will call me when she gets home.

## 2025-06-17 DIAGNOSIS — Z45.02 ICD (IMPLANTABLE CARDIOVERTER-DEFIBRILLATOR) BATTERY DEPLETION: Primary | ICD-10-CM

## 2025-06-17 DIAGNOSIS — I48.0 PAF (PAROXYSMAL ATRIAL FIBRILLATION) (HCC): ICD-10-CM

## 2025-06-17 DIAGNOSIS — I47.20 VT (VENTRICULAR TACHYCARDIA) (HCC): ICD-10-CM

## 2025-06-17 NOTE — TELEPHONE ENCOUNTER
Procedure: DC ICD GEN CH    Date Of Procedure:  7/18/25    Time Of Arrival: 1230PM    Procedure Time: 130PM       Called and spoke to patient and he is agreeable to the date and time. Reviewed the Pre-Procedure instructions and they verbalized understanding. Encouraged to call with any questions or concerns.       Published on Avolent / emailed to Cath lab / note in chart / scheduled in Epic/Cupid/Carto

## 2025-06-19 DIAGNOSIS — E78.2 MIXED HYPERLIPIDEMIA: ICD-10-CM

## 2025-06-19 DIAGNOSIS — I48.91 ATRIAL FIBRILLATION, UNSPECIFIED TYPE (HCC): ICD-10-CM

## 2025-06-19 DIAGNOSIS — Z95.1 S/P CABG (CORONARY ARTERY BYPASS GRAFT): ICD-10-CM

## 2025-06-19 DIAGNOSIS — I48.3 TYPICAL ATRIAL FLUTTER (HCC): ICD-10-CM

## 2025-06-19 DIAGNOSIS — I05.9 MITRAL VALVE DISORDER: ICD-10-CM

## 2025-06-19 DIAGNOSIS — I10 ESSENTIAL HYPERTENSION, BENIGN: ICD-10-CM

## 2025-06-19 DIAGNOSIS — I25.810 CORONARY ARTERY DISEASE INVOLVING CORONARY BYPASS GRAFT OF NATIVE HEART WITHOUT ANGINA PECTORIS: ICD-10-CM

## 2025-06-20 ENCOUNTER — RESULTS FOLLOW-UP (OUTPATIENT)
Dept: CARDIOLOGY CLINIC | Age: 70
End: 2025-06-20

## 2025-06-20 LAB
ALBUMIN SERPL-MCNC: 4.4 G/DL (ref 3.4–5)
ALBUMIN/GLOB SERPL: 1.5 {RATIO} (ref 1.1–2.2)
ALP SERPL-CCNC: 104 U/L (ref 40–129)
ALT SERPL-CCNC: 22 U/L (ref 10–40)
ANION GAP SERPL CALCULATED.3IONS-SCNC: 12 MMOL/L (ref 3–16)
AST SERPL-CCNC: 24 U/L (ref 15–37)
BASOPHILS # BLD: 0.1 K/UL (ref 0–0.2)
BASOPHILS NFR BLD: 1 %
BILIRUB SERPL-MCNC: 0.8 MG/DL (ref 0–1)
BUN SERPL-MCNC: 21 MG/DL (ref 7–20)
CALCIUM SERPL-MCNC: 9.3 MG/DL (ref 8.3–10.6)
CHLORIDE SERPL-SCNC: 105 MMOL/L (ref 99–110)
CHOLEST SERPL-MCNC: 171 MG/DL (ref 0–199)
CO2 SERPL-SCNC: 24 MMOL/L (ref 21–32)
CREAT SERPL-MCNC: 1.7 MG/DL (ref 0.8–1.3)
DEPRECATED RDW RBC AUTO: 14.8 % (ref 12.4–15.4)
EOSINOPHIL # BLD: 0.4 K/UL (ref 0–0.6)
EOSINOPHIL NFR BLD: 4 %
GFR SERPLBLD CREATININE-BSD FMLA CKD-EPI: 43 ML/MIN/{1.73_M2}
GLUCOSE SERPL-MCNC: 98 MG/DL (ref 70–99)
HCT VFR BLD AUTO: 45.9 % (ref 40.5–52.5)
HDLC SERPL-MCNC: 47 MG/DL (ref 40–60)
HGB BLD-MCNC: 15.5 G/DL (ref 13.5–17.5)
LDL CHOLESTEROL: 104 MG/DL
LYMPHOCYTES # BLD: 2.5 K/UL (ref 1–5.1)
LYMPHOCYTES NFR BLD: 24.6 %
MCH RBC QN AUTO: 31.1 PG (ref 26–34)
MCHC RBC AUTO-ENTMCNC: 33.8 G/DL (ref 31–36)
MCV RBC AUTO: 92.2 FL (ref 80–100)
MONOCYTES # BLD: 0.7 K/UL (ref 0–1.3)
MONOCYTES NFR BLD: 6.6 %
NEUTROPHILS # BLD: 6.5 K/UL (ref 1.7–7.7)
NEUTROPHILS NFR BLD: 63.8 %
PLATELET # BLD AUTO: 248 K/UL (ref 135–450)
PMV BLD AUTO: 9 FL (ref 5–10.5)
POTASSIUM SERPL-SCNC: 4.5 MMOL/L (ref 3.5–5.1)
PROT SERPL-MCNC: 7.4 G/DL (ref 6.4–8.2)
RBC # BLD AUTO: 4.98 M/UL (ref 4.2–5.9)
SODIUM SERPL-SCNC: 141 MMOL/L (ref 136–145)
TRIGL SERPL-MCNC: 99 MG/DL (ref 0–150)
VLDLC SERPL CALC-MCNC: 20 MG/DL
WBC # BLD AUTO: 10.1 K/UL (ref 4–11)

## 2025-07-11 ENCOUNTER — TELEPHONE (OUTPATIENT)
Dept: CARDIOLOGY CLINIC | Age: 70
End: 2025-07-11

## 2025-07-11 RX ORDER — FUROSEMIDE 40 MG/1
40 TABLET ORAL DAILY
Qty: 7 TABLET | Refills: 0 | Status: SHIPPED | OUTPATIENT
Start: 2025-07-11

## 2025-07-11 NOTE — TELEPHONE ENCOUNTER
Transmission not received. Will start lasix 40 mg daily for next 3 days. He will call us on Monday with update on his symptoms

## 2025-07-11 NOTE — TELEPHONE ENCOUNTER
Larisa, wife called to inform [facility] that the Pt is scheduled for an battery change on 07/18.  He is experiencing sob when walking and lying down.  Is this the effect of the Pacemaker battery being low that is causing his difficulty at this time.  Please call to discuss his concerns.  Thank you

## 2025-07-11 NOTE — TELEPHONE ENCOUNTER
Transmission sent again.     Unable to get BP.     HR on pulse ox has been fluctuating between 55, 70 and up to 100 range.

## 2025-07-11 NOTE — TELEPHONE ENCOUNTER
Spoke with patient's wife,  advised her that Lasix had been called in by [facility] and to call us on Monday with update on how patient is feeling.   Verbalized understanding.

## 2025-07-11 NOTE — TELEPHONE ENCOUNTER
Spoke with patient's wife,  patient has been SOB over the last week.  Patient woke up in his sleep last night gasping for air.     Transmission sent in.

## 2025-07-18 ENCOUNTER — HOSPITAL ENCOUNTER (OUTPATIENT)
Age: 70
Setting detail: OUTPATIENT SURGERY
Discharge: HOME | End: 2025-07-18
Attending: INTERNAL MEDICINE | Admitting: INTERNAL MEDICINE
Payer: MEDICARE

## 2025-07-18 VITALS
SYSTOLIC BLOOD PRESSURE: 142 MMHG | WEIGHT: 218 LBS | HEIGHT: 70 IN | TEMPERATURE: 97.9 F | DIASTOLIC BLOOD PRESSURE: 107 MMHG | OXYGEN SATURATION: 99 % | RESPIRATION RATE: 22 BRPM | HEART RATE: 85 BPM | BODY MASS INDEX: 31.21 KG/M2

## 2025-07-18 DIAGNOSIS — Z45.02 ICD (IMPLANTABLE CARDIOVERTER-DEFIBRILLATOR) BATTERY DEPLETION: ICD-10-CM

## 2025-07-18 LAB
ANION GAP SERPL CALCULATED.3IONS-SCNC: 12 MMOL/L (ref 3–16)
BASOPHILS # BLD: 0.1 K/UL (ref 0–0.2)
BASOPHILS NFR BLD: 1.2 %
BUN SERPL-MCNC: 32 MG/DL (ref 7–20)
CALCIUM SERPL-MCNC: 9.3 MG/DL (ref 8.3–10.6)
CHLORIDE SERPL-SCNC: 106 MMOL/L (ref 99–110)
CO2 SERPL-SCNC: 18 MMOL/L (ref 21–32)
CREAT SERPL-MCNC: 1.9 MG/DL (ref 0.8–1.3)
DEPRECATED RDW RBC AUTO: 14.3 % (ref 12.4–15.4)
ECHO BSA: 2.21 M2
EKG DIAGNOSIS: NORMAL
EKG Q-T INTERVAL: 364 MS
EKG QRS DURATION: 126 MS
EKG QTC CALCULATION (BAZETT): 483 MS
EKG R AXIS: 24 DEGREES
EKG T AXIS: 153 DEGREES
EKG VENTRICULAR RATE: 106 BPM
EOSINOPHIL # BLD: 0.4 K/UL (ref 0–0.6)
EOSINOPHIL NFR BLD: 5.3 %
GFR SERPLBLD CREATININE-BSD FMLA CKD-EPI: 37 ML/MIN/{1.73_M2}
GLUCOSE SERPL-MCNC: 99 MG/DL (ref 70–99)
HCT VFR BLD AUTO: 45 % (ref 40.5–52.5)
HGB BLD-MCNC: 15 G/DL (ref 13.5–17.5)
LYMPHOCYTES # BLD: 2 K/UL (ref 1–5.1)
LYMPHOCYTES NFR BLD: 24.4 %
MCH RBC QN AUTO: 30.5 PG (ref 26–34)
MCHC RBC AUTO-ENTMCNC: 33.3 G/DL (ref 31–36)
MCV RBC AUTO: 91.6 FL (ref 80–100)
MONOCYTES # BLD: 0.4 K/UL (ref 0–1.3)
MONOCYTES NFR BLD: 4.6 %
NEUTROPHILS # BLD: 5.4 K/UL (ref 1.7–7.7)
NEUTROPHILS NFR BLD: 64.5 %
PLATELET # BLD AUTO: 256 K/UL (ref 135–450)
PMV BLD AUTO: 8.2 FL (ref 5–10.5)
POTASSIUM SERPL-SCNC: ABNORMAL MMOL/L (ref 3.5–5.1)
RBC # BLD AUTO: 4.91 M/UL (ref 4.2–5.9)
REASON FOR REJECTION: NORMAL
REJECTED TEST: NORMAL
SODIUM SERPL-SCNC: 136 MMOL/L (ref 136–145)
WBC # BLD AUTO: 8.4 K/UL (ref 4–11)

## 2025-07-18 PROCEDURE — 99153 MOD SED SAME PHYS/QHP EA: CPT | Performed by: INTERNAL MEDICINE

## 2025-07-18 PROCEDURE — 2709999900 HC NON-CHARGEABLE SUPPLY: Performed by: INTERNAL MEDICINE

## 2025-07-18 PROCEDURE — 99152 MOD SED SAME PHYS/QHP 5/>YRS: CPT | Performed by: INTERNAL MEDICINE

## 2025-07-18 PROCEDURE — C1889 IMPLANT/INSERT DEVICE, NOC: HCPCS | Performed by: INTERNAL MEDICINE

## 2025-07-18 PROCEDURE — 7100000011 HC PHASE II RECOVERY - ADDTL 15 MIN: Performed by: INTERNAL MEDICINE

## 2025-07-18 PROCEDURE — 93010 ELECTROCARDIOGRAM REPORT: CPT | Performed by: INTERNAL MEDICINE

## 2025-07-18 PROCEDURE — 93005 ELECTROCARDIOGRAM TRACING: CPT | Performed by: INTERNAL MEDICINE

## 2025-07-18 PROCEDURE — 7100000010 HC PHASE II RECOVERY - FIRST 15 MIN: Performed by: INTERNAL MEDICINE

## 2025-07-18 PROCEDURE — 33263 RMVL & RPLCMT DFB GEN 2 LEAD: CPT | Performed by: INTERNAL MEDICINE

## 2025-07-18 PROCEDURE — 80048 BASIC METABOLIC PNL TOTAL CA: CPT

## 2025-07-18 PROCEDURE — 6360000002 HC RX W HCPCS: Performed by: INTERNAL MEDICINE

## 2025-07-18 PROCEDURE — 85025 COMPLETE CBC W/AUTO DIFF WBC: CPT

## 2025-07-18 PROCEDURE — C1721 AICD, DUAL CHAMBER: HCPCS | Performed by: INTERNAL MEDICINE

## 2025-07-18 DEVICE — ICD COBALT XT DR MRI DF4 2 CHMBR: Type: IMPLANTABLE DEVICE | Status: FUNCTIONAL

## 2025-07-18 DEVICE — ENVELOPE CMRM6133 ABSORB LRG MR
Type: IMPLANTABLE DEVICE | Status: FUNCTIONAL
Brand: TYRX™

## 2025-07-18 RX ORDER — FENTANYL CITRATE 50 UG/ML
INJECTION, SOLUTION INTRAMUSCULAR; INTRAVENOUS PRN
Status: DISCONTINUED | OUTPATIENT
Start: 2025-07-18 | End: 2025-07-18 | Stop reason: HOSPADM

## 2025-07-18 RX ORDER — CEFAZOLIN SODIUM 1 G/3ML
INJECTION, POWDER, FOR SOLUTION INTRAMUSCULAR; INTRAVENOUS PRN
Status: DISCONTINUED | OUTPATIENT
Start: 2025-07-18 | End: 2025-07-18 | Stop reason: HOSPADM

## 2025-07-18 RX ORDER — BUPIVACAINE HYDROCHLORIDE 5 MG/ML
INJECTION, SOLUTION EPIDURAL; INTRACAUDAL PRN
Status: DISCONTINUED | OUTPATIENT
Start: 2025-07-18 | End: 2025-07-18 | Stop reason: HOSPADM

## 2025-07-18 RX ORDER — MIDAZOLAM HYDROCHLORIDE 1 MG/ML
INJECTION, SOLUTION INTRAMUSCULAR; INTRAVENOUS PRN
Status: DISCONTINUED | OUTPATIENT
Start: 2025-07-18 | End: 2025-07-18 | Stop reason: HOSPADM

## 2025-07-18 NOTE — PROCEDURES
[facility]     Electrophysiology Procedure Note       Date of Procedure: 7/18/2025  Patient's Name: Kendrick Don  YOB: 1955   Medical Record Number: [id number]  Referring Physician: Wisam Gauthier MD  Procedure Performed by: Wisam Gauthier MD    Procedures performed:    Generator change out of dual chamber AICD  IV sedation.  Sedation start time: 13:05 PM  Sedation stop time: 14:02 PM  Fentanyl 125 mcg  Versed   3 mg  An independent trained observer assisted in the monitoring of the patient's level of consciousness and physiological status including vital signs.    Indication of the procedure: AICD battery depletion  Kendrick Don is a 69 y.o. male with CAD, VT and AICD with battery at the end of service requiring generator change.     Details of procedure:  The risks, benefits and alternatives of the procedure were discussed with the patient. The risks including, but not limited to, the risks of bleeding, infection, pain, device malfunction, lead dislodgement, injury to cardiac and surrounding structures, stroke, and death were discussed in detail. The patient opted to proceed with the device implantation. Written informed consent was signed and placed in the chart.   Patient was brought to the electrophysiology lab in a fasting non sedated state. Prophylactic antibiotic was given.    Chest was prepped and draped in the usual sterile fashion. After injection of 2% lidocaine in the left pectoral area, an incision was made over the previous scar and extended to the pocket using electrocautery and blunt dissection. The old device was removed.     The leads were tested and showed stable parameters. The pocket was vigorously irrigated with antibiotic solution and the leads were connected to the new pulse generator device which was implanted into the clean pocket. The pulse generator was sutured inside the pocket.  TYRX was used to reduce risk of infection. The pocket was then

## 2025-07-18 NOTE — PROGRESS NOTES
Pt wife reporting concerns with SOB recently. Pt wife states she assumed his awakening in the night \"gasping for air\" was related to his generator battery going bad. Pt wife reports frequently waking SOB and refusing to go to ED. Pt denies CP during these episodes. Pt wife states she called to speak with Dr. Joel about symptoms and Davidbong Robles called and spoke with her, adding Lasix 40mg for three doses per documentation in the note. Wife states patient actually took the lasix for 5 days but without improvement of symptoms. Dr. Bess is patients cardiologist. Recommended patient/ wife to please call office and schedule appointment soon for further care as well as going to Emergency Department when symptoms arise again. Patient and family agreeable.

## 2025-07-18 NOTE — H&P
[facility]   Electrophysiology Follow up   Date: 7/18/2025  I had the privilege of visiting Kendrick Don in the office.     CC:  PAF  HPI: Kendrick Don is a 69 y.o. male with a past medical history of HTN, HLD, CAD s/p CABG x2, Factor V Leiden, endocarditis s/p MV repair, CVA 2007,  VT s/p AICD 8/27/2015 and atrial flutter.     Placed on Xarelto, however he declined AC, risk of thromboembolism has been discussed.       Ischemic CMP with EF 45-50%.       He had frequent ventricular tachycardia requiring ATP 35 episodes back in 2018 and he is on amiodarone therapy. This was stopped in 2021 due to abnormal TSH. No longer on Amiodarone     1 shock terminated episode of AF with RVR noted on 3/2/2025 AF was noted since 2/27/2025. He had covid at the time.   Presented to  the following day with right sided weakness. No new LVO per CT. Patient was however started on eliquis for stroke prevention    History of Present Illness  He experienced a shock while seated in a chair. At the time of the incident, he was diagnosed with COVID-19 but was not on any specific medication for it. He reports no current symptoms of illness or irregular heart rhythms. His physical activity is limited.   He does not monitor his blood pressure at home regularly, but when he does, it tends to be slightly elevated. He is currently on metoprolol 150 mg daily, taken as 100 mg in the morning and 50 mg in the afternoon. He has previously been on amiodarone, which was discontinued in 2020 due to an abnormal high TSH level.      Assessment & Plan  1. Paroxysmal Atrial Fibrillation.     Device interrogation reviewed.     It revealed episodes of atrial fibrillation with rapid ventricular response, in the VF zone and patient received AICD shock converted him back to sinus rhythm.   This occurred at the time when he had COVID infection.     He used to be on amiodarone therapy which was discontinued since he had high TSH.   He has not had  involving coronary bypass graft of native heart without angina pectoris     AICD present, double chamber 08/27/2015    Ischemic cardiomyopathy     Ventricular tachycardia (HCC) 08/25/2015    Gastroenteritis 08/21/2015    HLD (hyperlipidemia) 09/21/2011    Coronary Artery Disease 09/21/2011    Essential hypertension, benign 09/21/2011    Mitral valve disorder 09/21/2011    Old MI (myocardial infarction) 09/21/2011    Endocarditis 09/21/2011    CVA (cerebral vascular accident) (HCC) 09/21/2011    DVT (deep vein thrombosis), lower extremity, distal (calf) (peroneal)(tibial) (lower leg NOS) 09/21/2011    Spastic hemiplegia affecting nondominant side (HCC) 09/09/2008    Muscle weakness (generalized) 04/02/2008    Cognitive deficits as late effect of cerebrovascular disease 04/02/2008     Diagnostic studies:   ECG SR    Echo 3/4/2025 ()  - Left ventricle: The cavity size is normal. Wall thickness is normal.     Systolic function is mildly to moderately reduced. The estimated ejection     fraction is 40-45%. Hypokinesis of the apical wall. Cannot assess LV     diastolic function. There is no evidence of a thrombus revealed by     acoustic contrast opacification.   - Aortic valve: There is mild thickening. There is mild calcification.   - Mitral valve: The annulus is moderately calcified. An annuloplasty ring is     present. Prior procedures include surgical repair. Inflow velocity is     increased. The findings are consistent with mild stenosis. The mean     diastolic gradient is 5mm Hg.   - Left atrium: The atrium is moderately dilated.   - Right ventricle: Systolic function is reduced by visual assessment.     Echo 8/1/2024    Left Ventricle: Mildly reduced left ventricular systolic function with a visually estimated EF of 45 - 50%. Left ventricle is mildly dilated. Mildly increased wall thickness. Severe hypokinesis of the following segments: basal inferior and basal inferoseptal. Aneurysm in the following segments:

## 2025-07-18 NOTE — DISCHARGE INSTRUCTIONS
ICD/PACEMAKER GENERATOR REPLACEMENT DISCHARGE INSTRUCTIONS    No Driving for 24 hours. We strongly recommend that a responsible adult stay with you for the next 24 hours.    Leave outer dressing on until follow up appointment. Dressing needs to be removed by nurse to ensure skin integrity.    Do not get dressing site wet for 24 hours. After 24 hours you may shower but do not face the water spray.    You may be sore, bruised and have a small amount of drainage around the incision site.    Please contact the office if you notice any signs of infection:  Redness / swelling at the incision site  Fever  Yellow drainage at the site  Pain     Phone: [phone]

## 2025-07-28 ENCOUNTER — TELEPHONE (OUTPATIENT)
Dept: CARDIOLOGY CLINIC | Age: 70
End: 2025-07-28

## 2025-07-28 NOTE — TELEPHONE ENCOUNTER
He has EP follow up. If still in afib, cardioversion can be considered once he is more stable and discharged from hospital

## 2025-07-28 NOTE — TELEPHONE ENCOUNTER
Pt wis currently hospitalized at [facility] for SOB. ( small blot clot found in lung and blockages found in heart during cath. Pt's wife would like to consult with LIZZY and ESPERANZA on her husbands care moving forward     Please call to discuss

## 2025-07-28 NOTE — TELEPHONE ENCOUNTER
Spoke with his wife. Cardiologist in room. Cardiologist states he looks good. Creat after contrast up to 2.5. EF now 15-20% with persistent atrial fib. Also being treated for pulmonary embolus  Wife will contact us when discharged    Patient and wife reported that he had new shortness of breath prior to battery change

## 2025-08-20 ENCOUNTER — OFFICE VISIT (OUTPATIENT)
Dept: CARDIOLOGY CLINIC | Age: 70
End: 2025-08-20
Payer: MEDICARE

## 2025-08-20 VITALS
DIASTOLIC BLOOD PRESSURE: 82 MMHG | WEIGHT: 205 LBS | HEART RATE: 60 BPM | HEIGHT: 71 IN | BODY MASS INDEX: 28.7 KG/M2 | SYSTOLIC BLOOD PRESSURE: 144 MMHG | OXYGEN SATURATION: 98 %

## 2025-08-20 DIAGNOSIS — I48.91 ATRIAL FIBRILLATION, UNSPECIFIED TYPE (HCC): ICD-10-CM

## 2025-08-20 DIAGNOSIS — Z98.890 HX OF MITRAL VALVE REPAIR: ICD-10-CM

## 2025-08-20 DIAGNOSIS — I48.3 TYPICAL ATRIAL FLUTTER (HCC): ICD-10-CM

## 2025-08-20 DIAGNOSIS — I25.810 CORONARY ARTERY DISEASE INVOLVING CORONARY BYPASS GRAFT OF NATIVE HEART WITHOUT ANGINA PECTORIS: ICD-10-CM

## 2025-08-20 DIAGNOSIS — I10 ESSENTIAL HYPERTENSION, BENIGN: Chronic | ICD-10-CM

## 2025-08-20 DIAGNOSIS — I25.10 ATHEROSCLEROSIS OF NATIVE CORONARY ARTERY OF NATIVE HEART WITHOUT ANGINA PECTORIS: Primary | ICD-10-CM

## 2025-08-20 DIAGNOSIS — Z95.1 S/P CABG (CORONARY ARTERY BYPASS GRAFT): ICD-10-CM

## 2025-08-20 DIAGNOSIS — E78.2 MIXED HYPERLIPIDEMIA: Chronic | ICD-10-CM

## 2025-08-20 DIAGNOSIS — I05.9 MITRAL VALVE DISORDER: Chronic | ICD-10-CM

## 2025-08-20 DIAGNOSIS — R06.02 SHORTNESS OF BREATH: ICD-10-CM

## 2025-08-20 PROCEDURE — 3079F DIAST BP 80-89 MM HG: CPT | Performed by: INTERNAL MEDICINE

## 2025-08-20 PROCEDURE — 93000 ELECTROCARDIOGRAM COMPLETE: CPT | Performed by: INTERNAL MEDICINE

## 2025-08-20 PROCEDURE — 1123F ACP DISCUSS/DSCN MKR DOCD: CPT | Performed by: INTERNAL MEDICINE

## 2025-08-20 PROCEDURE — 99214 OFFICE O/P EST MOD 30 MIN: CPT | Performed by: INTERNAL MEDICINE

## 2025-08-20 PROCEDURE — 1159F MED LIST DOCD IN RCRD: CPT | Performed by: INTERNAL MEDICINE

## 2025-08-20 PROCEDURE — 3077F SYST BP >= 140 MM HG: CPT | Performed by: INTERNAL MEDICINE

## 2025-08-20 RX ORDER — EPLERENONE 25 MG/1
25 TABLET ORAL DAILY
Qty: 90 TABLET | Refills: 3 | Status: SHIPPED | OUTPATIENT
Start: 2025-08-20

## 2025-08-20 RX ORDER — FUROSEMIDE 20 MG/1
20 TABLET ORAL DAILY
Qty: 90 TABLET | Refills: 3 | Status: SHIPPED | OUTPATIENT
Start: 2025-08-20

## 2025-08-20 RX ORDER — CLOPIDOGREL BISULFATE 75 MG/1
75 TABLET ORAL DAILY
COMMUNITY

## 2025-08-20 RX ORDER — EPLERENONE 25 MG/1
25 TABLET ORAL DAILY
COMMUNITY
End: 2025-08-20 | Stop reason: SDUPTHER

## 2025-08-20 RX ORDER — ATORVASTATIN CALCIUM 40 MG/1
40 TABLET, FILM COATED ORAL DAILY
Qty: 90 TABLET | Refills: 3 | Status: SHIPPED | OUTPATIENT
Start: 2025-08-20

## 2025-09-02 ENCOUNTER — HOSPITAL ENCOUNTER (OUTPATIENT)
Age: 70
Discharge: HOME | End: 2025-09-04
Attending: INTERNAL MEDICINE
Payer: MEDICARE

## 2025-09-02 VITALS
HEART RATE: 66 BPM | BODY MASS INDEX: 29.4 KG/M2 | HEIGHT: 71 IN | DIASTOLIC BLOOD PRESSURE: 76 MMHG | SYSTOLIC BLOOD PRESSURE: 148 MMHG | WEIGHT: 210 LBS

## 2025-09-02 VITALS — HEIGHT: 71 IN | WEIGHT: 210 LBS | BODY MASS INDEX: 29.4 KG/M2

## 2025-09-02 DIAGNOSIS — Z95.1 S/P CABG (CORONARY ARTERY BYPASS GRAFT): ICD-10-CM

## 2025-09-02 DIAGNOSIS — I25.10 ATHEROSCLEROSIS OF NATIVE CORONARY ARTERY OF NATIVE HEART WITHOUT ANGINA PECTORIS: ICD-10-CM

## 2025-09-02 DIAGNOSIS — Z98.890 HX OF MITRAL VALVE REPAIR: ICD-10-CM

## 2025-09-02 DIAGNOSIS — I05.9 MITRAL VALVE DISORDER: Chronic | ICD-10-CM

## 2025-09-02 PROBLEM — Z45.02 ICD (IMPLANTABLE CARDIOVERTER-DEFIBRILLATOR) BATTERY DEPLETION: Status: RESOLVED | Noted: 2025-06-17 | Resolved: 2025-09-02

## 2025-09-02 LAB
ECHO BSA: 2.18 M2
NUC STRESS EJECTION FRACTION: 23 %
NUC STRESS LV EDV: 238 ML (ref 67–155)
NUC STRESS LV ESV: 183 ML (ref 22–58)
NUC STRESS LV MASS: 224 G
STRESS BASELINE DIAS BP: 76 MMHG
STRESS BASELINE HR: 65 BPM
STRESS BASELINE SYS BP: 148 MMHG
STRESS ESTIMATED WORKLOAD: 0 METS
STRESS EXERCISE DUR MIN: 4 MIN
STRESS EXERCISE DUR SEC: 0 SEC
STRESS O2 SAT PEAK: 98 %
STRESS O2 SAT REST: 98 %
STRESS PEAK DIAS BP: 61 MMHG
STRESS PEAK SYS BP: 137 MMHG
STRESS PERCENT HR ACHIEVED: 45 %
STRESS POST PEAK HR: 68 BPM
STRESS RATE PRESSURE PRODUCT: 9316 BPM*MMHG
STRESS TARGET HR: 150 BPM
TID: 1.1

## 2025-09-02 PROCEDURE — 93017 CV STRESS TEST TRACING ONLY: CPT

## 2025-09-02 PROCEDURE — A9502 TC99M TETROFOSMIN: HCPCS | Performed by: INTERNAL MEDICINE

## 2025-09-02 PROCEDURE — 93016 CV STRESS TEST SUPVJ ONLY: CPT | Performed by: STUDENT IN AN ORGANIZED HEALTH CARE EDUCATION/TRAINING PROGRAM

## 2025-09-02 PROCEDURE — 93018 CV STRESS TEST I&R ONLY: CPT | Performed by: STUDENT IN AN ORGANIZED HEALTH CARE EDUCATION/TRAINING PROGRAM

## 2025-09-02 PROCEDURE — 6360000002 HC RX W HCPCS: Performed by: INTERNAL MEDICINE

## 2025-09-02 PROCEDURE — 78452 HT MUSCLE IMAGE SPECT MULT: CPT | Performed by: STUDENT IN AN ORGANIZED HEALTH CARE EDUCATION/TRAINING PROGRAM

## 2025-09-02 PROCEDURE — 3430000000 HC RX DIAGNOSTIC RADIOPHARMACEUTICAL: Performed by: INTERNAL MEDICINE

## 2025-09-02 PROCEDURE — 78452 HT MUSCLE IMAGE SPECT MULT: CPT

## 2025-09-02 RX ORDER — REGADENOSON 0.08 MG/ML
0.4 INJECTION, SOLUTION INTRAVENOUS
Status: COMPLETED | OUTPATIENT
Start: 2025-09-02 | End: 2025-09-02

## 2025-09-02 RX ADMIN — TETROFOSMIN 32.7 MILLICURIE: 1.38 INJECTION, POWDER, LYOPHILIZED, FOR SOLUTION INTRAVENOUS at 13:52

## 2025-09-02 RX ADMIN — TETROFOSMIN 10.4 MILLICURIE: 1.38 INJECTION, POWDER, LYOPHILIZED, FOR SOLUTION INTRAVENOUS at 13:05

## 2025-09-02 RX ADMIN — REGADENOSON 0.4 MG: 0.08 INJECTION, SOLUTION INTRAVENOUS at 13:50

## 2025-09-03 ENCOUNTER — OFFICE VISIT (OUTPATIENT)
Dept: CARDIOLOGY CLINIC | Age: 70
End: 2025-09-03
Payer: MEDICARE

## 2025-09-03 VITALS
BODY MASS INDEX: 28.59 KG/M2 | OXYGEN SATURATION: 100 % | HEIGHT: 71 IN | SYSTOLIC BLOOD PRESSURE: 150 MMHG | HEART RATE: 69 BPM | DIASTOLIC BLOOD PRESSURE: 88 MMHG | WEIGHT: 204.2 LBS

## 2025-09-03 DIAGNOSIS — I25.83 CORONARY ARTERY DISEASE DUE TO LIPID RICH PLAQUE: ICD-10-CM

## 2025-09-03 DIAGNOSIS — I47.20 VENTRICULAR TACHYCARDIA (HCC): ICD-10-CM

## 2025-09-03 DIAGNOSIS — I63.9 CEREBROVASCULAR ACCIDENT (CVA), UNSPECIFIED MECHANISM (HCC): ICD-10-CM

## 2025-09-03 DIAGNOSIS — Z95.1 S/P CABG (CORONARY ARTERY BYPASS GRAFT): Primary | ICD-10-CM

## 2025-09-03 DIAGNOSIS — I25.10 CORONARY ARTERY DISEASE DUE TO LIPID RICH PLAQUE: ICD-10-CM

## 2025-09-03 DIAGNOSIS — E78.2 MIXED HYPERLIPIDEMIA: ICD-10-CM

## 2025-09-03 DIAGNOSIS — N18.32 STAGE 3B CHRONIC KIDNEY DISEASE (HCC): ICD-10-CM

## 2025-09-03 DIAGNOSIS — I48.91 ATRIAL FIBRILLATION, UNSPECIFIED TYPE (HCC): ICD-10-CM

## 2025-09-03 DIAGNOSIS — I10 ESSENTIAL HYPERTENSION, BENIGN: ICD-10-CM

## 2025-09-03 DIAGNOSIS — I25.5 ISCHEMIC CARDIOMYOPATHY: ICD-10-CM

## 2025-09-03 DIAGNOSIS — Z95.810 AICD PRESENT, DOUBLE CHAMBER: ICD-10-CM

## 2025-09-03 DIAGNOSIS — I82.4Z9 DEEP VEIN THROMBOSIS (DVT) OF DISTAL VEIN OF LOWER EXTREMITY, UNSPECIFIED CHRONICITY, UNSPECIFIED LATERALITY (HCC): ICD-10-CM

## 2025-09-03 DIAGNOSIS — I05.9 MITRAL VALVE DISORDER: ICD-10-CM

## 2025-09-03 PROCEDURE — 3079F DIAST BP 80-89 MM HG: CPT | Performed by: INTERNAL MEDICINE

## 2025-09-03 PROCEDURE — 3077F SYST BP >= 140 MM HG: CPT | Performed by: INTERNAL MEDICINE

## 2025-09-03 PROCEDURE — 1123F ACP DISCUSS/DSCN MKR DOCD: CPT | Performed by: INTERNAL MEDICINE

## 2025-09-03 PROCEDURE — 99214 OFFICE O/P EST MOD 30 MIN: CPT | Performed by: INTERNAL MEDICINE

## 2025-09-03 PROCEDURE — 1159F MED LIST DOCD IN RCRD: CPT | Performed by: INTERNAL MEDICINE

## (undated) DEVICE — PAD, DEFIB, ADULT, RADIOTRANS, PHYSIO: Brand: MEDLINE

## (undated) DEVICE — PACEMAKER PACK: Brand: MEDLINE INDUSTRIES, INC.

## (undated) DEVICE — Device: Brand: NOMOLINE™ LH ADULT NASAL CO2 CANNULA WITH O2 4M

## (undated) DEVICE — PENCIL SMK EVAC L10FT TBNG NONSTICK ESU BLDE PLUMEPEN ELITE

## (undated) DEVICE — RESTRAINT EXT ANK WRST LT BLU FOAM 2 STRP SIDE BCKL HK AND

## (undated) DEVICE — COVER LT HNDL UNIV FOR LNG HNDL KOVER 1 PER PK

## (undated) DEVICE — STRIP,CLOSURE,WOUND,MEDI-STRIP,1/2X4: Brand: MEDLINE

## (undated) DEVICE — PROBE COVER KIT: Brand: MEDLINE INDUSTRIES, INC.

## (undated) DEVICE — ELECTRODE PT RET AD L9FT HI MOIST COND ADH HYDRGEL CORDED

## (undated) DEVICE — DRESSING HYDROFIBER AQUACEL AG ADVANTAGE 3.5X6 IN

## (undated) DEVICE — MASIMOSET LNCS ADTX SPO2 ADULT PULSE OXIMETER ADHESIVE SENSOR: Brand: MASIMOSET® LNCS® ADTX SPO2 ADULT PULSE OXIMETER ADHESIVE SENSOR